# Patient Record
Sex: FEMALE | Race: WHITE | Employment: FULL TIME | URBAN - METROPOLITAN AREA
[De-identification: names, ages, dates, MRNs, and addresses within clinical notes are randomized per-mention and may not be internally consistent; named-entity substitution may affect disease eponyms.]

---

## 2018-08-29 ENCOUNTER — HOSPITAL ENCOUNTER (INPATIENT)
Age: 68
LOS: 6 days | Discharge: HOME | DRG: 881 | End: 2018-09-04
Attending: PSYCHIATRY & NEUROLOGY
Payer: MEDICARE

## 2018-08-29 PROBLEM — F32.9 MAJOR DEPRESSIVE DISORDER: Status: ACTIVE | Noted: 2018-08-29

## 2018-08-29 PROBLEM — E11.9 TYPE 2 DIABETES MELLITUS (HCC): Status: ACTIVE | Noted: 2018-08-29

## 2018-08-29 LAB
APTT PPP: 36.4 SEC (ref 22.1–32)
BASOPHILS # BLD: 0 K/UL (ref 0–0.1)
BASOPHILS NFR BLD: 0 % (ref 0–1)
DIFFERENTIAL METHOD BLD: NORMAL
EOSINOPHIL # BLD: 0.1 K/UL (ref 0–0.4)
EOSINOPHIL NFR BLD: 1 % (ref 0–7)
ERYTHROCYTE [DISTWIDTH] IN BLOOD BY AUTOMATED COUNT: 13.2 % (ref 11.5–14.5)
EST. AVERAGE GLUCOSE BLD GHB EST-MCNC: 160 MG/DL
GLUCOSE BLD STRIP.AUTO-MCNC: 118 MG/DL (ref 65–100)
GLUCOSE BLD STRIP.AUTO-MCNC: 153 MG/DL (ref 65–100)
HBA1C MFR BLD: 7.2 % (ref 4.2–6.3)
HCT VFR BLD AUTO: 40.1 % (ref 35–47)
HGB BLD-MCNC: 13.3 G/DL (ref 11.5–16)
IMM GRANULOCYTES # BLD: 0 K/UL (ref 0–0.04)
IMM GRANULOCYTES NFR BLD AUTO: 0 % (ref 0–0.5)
INR PPP: 1 (ref 0.9–1.1)
LYMPHOCYTES # BLD: 2.4 K/UL (ref 0.8–3.5)
LYMPHOCYTES NFR BLD: 28 % (ref 12–49)
MCH RBC QN AUTO: 31.1 PG (ref 26–34)
MCHC RBC AUTO-ENTMCNC: 33.2 G/DL (ref 30–36.5)
MCV RBC AUTO: 93.7 FL (ref 80–99)
MONOCYTES # BLD: 0.6 K/UL (ref 0–1)
MONOCYTES NFR BLD: 7 % (ref 5–13)
NEUTS SEG # BLD: 5.5 K/UL (ref 1.8–8)
NEUTS SEG NFR BLD: 64 % (ref 32–75)
NRBC # BLD: 0 K/UL (ref 0–0.01)
NRBC BLD-RTO: 0 PER 100 WBC
PLATELET # BLD AUTO: 292 K/UL (ref 150–400)
PMV BLD AUTO: 10.9 FL (ref 8.9–12.9)
PROTHROMBIN TIME: 10.2 SEC (ref 9–11.1)
RBC # BLD AUTO: 4.28 M/UL (ref 3.8–5.2)
SERVICE CMNT-IMP: ABNORMAL
SERVICE CMNT-IMP: ABNORMAL
THERAPEUTIC RANGE,PTTT: ABNORMAL SECS (ref 58–77)
TSH SERPL DL<=0.05 MIU/L-ACNC: 6.23 UIU/ML (ref 0.36–3.74)
WBC # BLD AUTO: 8.6 K/UL (ref 3.6–11)

## 2018-08-29 PROCEDURE — 84443 ASSAY THYROID STIM HORMONE: CPT

## 2018-08-29 PROCEDURE — 93041 RHYTHM ECG TRACING: CPT

## 2018-08-29 PROCEDURE — 81001 URINALYSIS AUTO W/SCOPE: CPT | Performed by: FAMILY MEDICINE

## 2018-08-29 PROCEDURE — 74011250637 HC RX REV CODE- 250/637

## 2018-08-29 PROCEDURE — 82962 GLUCOSE BLOOD TEST: CPT

## 2018-08-29 PROCEDURE — 74011250637 HC RX REV CODE- 250/637: Performed by: PSYCHIATRY & NEUROLOGY

## 2018-08-29 PROCEDURE — 85025 COMPLETE CBC W/AUTO DIFF WBC: CPT | Performed by: PSYCHIATRY & NEUROLOGY

## 2018-08-29 PROCEDURE — 85730 THROMBOPLASTIN TIME PARTIAL: CPT | Performed by: FAMILY MEDICINE

## 2018-08-29 PROCEDURE — 36415 COLL VENOUS BLD VENIPUNCTURE: CPT | Performed by: FAMILY MEDICINE

## 2018-08-29 PROCEDURE — 83036 HEMOGLOBIN GLYCOSYLATED A1C: CPT | Performed by: FAMILY MEDICINE

## 2018-08-29 PROCEDURE — 65220000003 HC RM SEMIPRIVATE PSYCH

## 2018-08-29 PROCEDURE — 85610 PROTHROMBIN TIME: CPT | Performed by: FAMILY MEDICINE

## 2018-08-29 RX ORDER — LOSARTAN POTASSIUM 50 MG/1
100 TABLET ORAL DAILY
Status: DISCONTINUED | OUTPATIENT
Start: 2018-08-30 | End: 2018-08-30 | Stop reason: SDUPTHER

## 2018-08-29 RX ORDER — BENZTROPINE MESYLATE 1 MG/ML
2 INJECTION INTRAMUSCULAR; INTRAVENOUS
Status: DISCONTINUED | OUTPATIENT
Start: 2018-08-29 | End: 2018-09-04 | Stop reason: HOSPADM

## 2018-08-29 RX ORDER — BENZTROPINE MESYLATE 1 MG/1
2 TABLET ORAL
Status: DISCONTINUED | OUTPATIENT
Start: 2018-08-29 | End: 2018-09-04 | Stop reason: HOSPADM

## 2018-08-29 RX ORDER — FUROSEMIDE 20 MG/1
20 TABLET ORAL
COMMUNITY
End: 2018-09-04

## 2018-08-29 RX ORDER — LOPERAMIDE HYDROCHLORIDE 2 MG/1
4 CAPSULE ORAL
Status: DISCONTINUED | OUTPATIENT
Start: 2018-08-29 | End: 2018-09-04 | Stop reason: HOSPADM

## 2018-08-29 RX ORDER — METFORMIN HYDROCHLORIDE 500 MG/1
500 TABLET ORAL
Status: DISCONTINUED | OUTPATIENT
Start: 2018-08-29 | End: 2018-08-29

## 2018-08-29 RX ORDER — LORAZEPAM 1 MG/1
1 TABLET ORAL
Status: DISCONTINUED | OUTPATIENT
Start: 2018-08-29 | End: 2018-08-30

## 2018-08-29 RX ORDER — DILTIAZEM HYDROCHLORIDE 360 MG/1
360 CAPSULE, EXTENDED RELEASE ORAL DAILY
COMMUNITY

## 2018-08-29 RX ORDER — DILTIAZEM HYDROCHLORIDE 180 MG/1
360 CAPSULE, COATED, EXTENDED RELEASE ORAL DAILY
Status: DISCONTINUED | OUTPATIENT
Start: 2018-08-29 | End: 2018-09-04 | Stop reason: HOSPADM

## 2018-08-29 RX ORDER — METFORMIN HYDROCHLORIDE 500 MG/1
TABLET ORAL
COMMUNITY

## 2018-08-29 RX ORDER — IBUPROFEN 200 MG
1 TABLET ORAL
Status: DISCONTINUED | OUTPATIENT
Start: 2018-08-29 | End: 2018-09-04 | Stop reason: HOSPADM

## 2018-08-29 RX ORDER — VALSARTAN AND HYDROCHLOROTHIAZIDE 160; 25 MG/1; MG/1
1 TABLET ORAL DAILY
COMMUNITY

## 2018-08-29 RX ORDER — LORAZEPAM 2 MG/ML
2 INJECTION INTRAMUSCULAR
Status: DISCONTINUED | OUTPATIENT
Start: 2018-08-29 | End: 2018-09-04 | Stop reason: HOSPADM

## 2018-08-29 RX ORDER — ZOLPIDEM TARTRATE 5 MG/1
5 TABLET ORAL
Status: DISCONTINUED | OUTPATIENT
Start: 2018-08-29 | End: 2018-09-04 | Stop reason: HOSPADM

## 2018-08-29 RX ORDER — TRAZODONE HYDROCHLORIDE 50 MG/1
50 TABLET ORAL
Status: DISCONTINUED | OUTPATIENT
Start: 2018-08-29 | End: 2018-09-04 | Stop reason: HOSPADM

## 2018-08-29 RX ORDER — CITALOPRAM 20 MG/1
20 TABLET, FILM COATED ORAL DAILY
COMMUNITY
End: 2018-09-04

## 2018-08-29 RX ORDER — TRAZODONE HYDROCHLORIDE 50 MG/1
50 TABLET ORAL
Status: DISCONTINUED | OUTPATIENT
Start: 2018-08-29 | End: 2018-08-29

## 2018-08-29 RX ORDER — MAGNESIUM SULFATE 100 %
4 CRYSTALS MISCELLANEOUS AS NEEDED
Status: DISCONTINUED | OUTPATIENT
Start: 2018-08-29 | End: 2018-09-04 | Stop reason: HOSPADM

## 2018-08-29 RX ORDER — INSULIN LISPRO 100 [IU]/ML
INJECTION, SOLUTION INTRAVENOUS; SUBCUTANEOUS
Status: DISCONTINUED | OUTPATIENT
Start: 2018-08-29 | End: 2018-08-29

## 2018-08-29 RX ORDER — TRAZODONE HYDROCHLORIDE 50 MG/1
50 TABLET ORAL
COMMUNITY

## 2018-08-29 RX ORDER — INSULIN LISPRO 100 [IU]/ML
INJECTION, SOLUTION INTRAVENOUS; SUBCUTANEOUS 2 TIMES DAILY
Status: DISCONTINUED | OUTPATIENT
Start: 2018-08-30 | End: 2018-08-31

## 2018-08-29 RX ORDER — HYDROCHLOROTHIAZIDE 25 MG/1
25 TABLET ORAL DAILY
Status: DISCONTINUED | OUTPATIENT
Start: 2018-08-30 | End: 2018-08-30 | Stop reason: SDUPTHER

## 2018-08-29 RX ORDER — METFORMIN HYDROCHLORIDE 500 MG/1
500 TABLET ORAL
Status: DISCONTINUED | OUTPATIENT
Start: 2018-08-29 | End: 2018-09-04 | Stop reason: HOSPADM

## 2018-08-29 RX ORDER — METFORMIN HYDROCHLORIDE 500 MG/1
1000 TABLET ORAL
Status: DISCONTINUED | OUTPATIENT
Start: 2018-08-29 | End: 2018-09-04 | Stop reason: HOSPADM

## 2018-08-29 RX ORDER — ACETAMINOPHEN 325 MG/1
650 TABLET ORAL
Status: DISCONTINUED | OUTPATIENT
Start: 2018-08-29 | End: 2018-09-04 | Stop reason: HOSPADM

## 2018-08-29 RX ORDER — GABAPENTIN 300 MG/1
600 CAPSULE ORAL 2 TIMES DAILY
Status: DISCONTINUED | OUTPATIENT
Start: 2018-08-29 | End: 2018-09-04 | Stop reason: HOSPADM

## 2018-08-29 RX ORDER — GABAPENTIN 300 MG/1
300 CAPSULE ORAL 2 TIMES DAILY
COMMUNITY
End: 2018-09-04

## 2018-08-29 RX ORDER — CITALOPRAM 20 MG/1
40 TABLET, FILM COATED ORAL DAILY
Status: DISCONTINUED | OUTPATIENT
Start: 2018-08-29 | End: 2018-09-04 | Stop reason: HOSPADM

## 2018-08-29 RX ORDER — IBUPROFEN 400 MG/1
400 TABLET ORAL
Status: DISCONTINUED | OUTPATIENT
Start: 2018-08-29 | End: 2018-08-29

## 2018-08-29 RX ORDER — ADHESIVE BANDAGE
30 BANDAGE TOPICAL DAILY PRN
Status: DISCONTINUED | OUTPATIENT
Start: 2018-08-29 | End: 2018-09-04 | Stop reason: HOSPADM

## 2018-08-29 RX ORDER — DEXTROSE 50 % IN WATER (D50W) INTRAVENOUS SYRINGE
25-50 AS NEEDED
Status: DISCONTINUED | OUTPATIENT
Start: 2018-08-29 | End: 2018-09-04 | Stop reason: HOSPADM

## 2018-08-29 RX ORDER — OLANZAPINE 5 MG/1
5 TABLET ORAL
Status: DISCONTINUED | OUTPATIENT
Start: 2018-08-29 | End: 2018-09-04 | Stop reason: HOSPADM

## 2018-08-29 RX ADMIN — METFORMIN HYDROCHLORIDE 1000 MG: 500 TABLET ORAL at 09:19

## 2018-08-29 RX ADMIN — GABAPENTIN 600 MG: 300 CAPSULE ORAL at 09:18

## 2018-08-29 RX ADMIN — LOPERAMIDE HYDROCHLORIDE 4 MG: 2 CAPSULE ORAL at 17:35

## 2018-08-29 RX ADMIN — RIVAROXABAN 20 MG: 20 TABLET, FILM COATED ORAL at 17:35

## 2018-08-29 RX ADMIN — GABAPENTIN 600 MG: 300 CAPSULE ORAL at 17:34

## 2018-08-29 RX ADMIN — CITALOPRAM HYDROBROMIDE 40 MG: 20 TABLET ORAL at 09:18

## 2018-08-29 RX ADMIN — METFORMIN HYDROCHLORIDE 500 MG: 500 TABLET ORAL at 21:00

## 2018-08-29 RX ADMIN — DILTIAZEM HYDROCHLORIDE 360 MG: 180 CAPSULE, COATED, EXTENDED RELEASE ORAL at 14:21

## 2018-08-29 RX ADMIN — TRAZODONE HYDROCHLORIDE 50 MG: 50 TABLET ORAL at 21:00

## 2018-08-29 NOTE — PROGRESS NOTES
Admission Medication Reconciliation: 
 
Information obtained from:  Communication with [facility]Query Comments/Recommendations: Updated PTA meds/reviewed patient's allergies. 1)  Called pharmacy to confirm medications. 2)  Medication changes (since last review): Added 
- all medications 3)  Of note, the patient filled alprazolam 0.25mg TID PRN (#60, 20 ds) on 7/16 but it is not a regular medication for her. Also, pharmacy has glimepiride 2mg BID on hold from 7/10/18 (never picked up). Allergies:  Robaxin [methocarbamol] and Morphine Significant PMH/Disease States: No past medical history on file. Chief Complaint for this Admission:  No chief complaint on file. Prior to Admission Medications:  
Prior to Admission Medications Prescriptions Last Dose Informant Patient Reported? Taking?  
citalopram (CELEXA) 20 mg tablet   Yes Yes Sig: Take 20 mg by mouth daily. Indications: major depressive disorder  
dilTIAZem (TIAZAC) 360 mg ER capsule   Yes Yes Sig: Take 360 mg by mouth daily. furosemide (LASIX) 20 mg tablet   Yes Yes Sig: Take 20 mg by mouth daily as needed. gabapentin (NEURONTIN) 300 mg capsule   Yes Yes Sig: Take 300 mg by mouth two (2) times a day. metFORMIN (GLUCOPHAGE) 500 mg tablet   Yes Yes Sig: Take 1000mg every morning and 500mg every evening  Indications: type 2 diabetes mellitus  
rivaroxaban (XARELTO) 20 mg tab tablet   Yes Yes Sig: Take 20 mg by mouth daily. traZODone (DESYREL) 50 mg tablet   Yes Yes Sig: Take 50 mg by mouth nightly. Indications: insomnia associated with depression  
valsartan-hydroCHLOROthiazide (DIOVAN-HCT) 160-25 mg per tablet   Yes Yes Sig: Take 1 Tab by mouth daily. Indications: hypertension Facility-Administered Medications: None Aspen Herrera, PharmD, BCPP, BCPS Clinical Pharmacy Specialist, Tara Benítez

## 2018-08-29 NOTE — INTERDISCIPLINARY ROUNDS
Behavioral Health Interdisciplinary Rounds Patient Name: Joellen Multani  Age: 69 y.o. Room/Bed:  732/02 Primary Diagnosis: <principal problem not specified> Admission Status: TDO Readmission within 30 days: no 
Power of  in place: no 
Patient requires a blocked bed: no          Reason for blocked bed: n/a 
 
VTE Prophylaxis: Not indicated Mobility needs/Fall risk: no   
Nutritional Plan: no 
Consults: H&P completed Labs/Testing due today?: yes-UA/C&S, ECG Sleep hours:  Admit @ 0400 Participation in Care/Groups:  NEW ADMIT Medication Compliant?: NO NEW MEDS GIVEN  
PRNS (last 24 hours): None Restraints (last 24 hours):  no 
  
CIWA (range last 24 hours): COWS (range last 24 hours): Alcohol screening (AUDIT) completed -   AUDIT Score: 0 If applicable, date SBIRT discussed in treatment team AND documented:  
 
Tobacco - patient is a smoker: Have You Used Tobacco in the Past 30 Days: No 
Illegal Drugs use: Have You Used Any Illegal Substances Over the Past 12 Months: No 
 
24 hour chart check complete: yes Patient goal(s) for today:  
Treatment team focus/goals: Plan to set up her hearing and assess for medications and discharge needs. Progress note - she was sad and tearful in treatment team.  Talked about the death of her  and her feelings towards her only son LOS:  0  Expected LOS: TBD Financial concerns/prescription coverage: medicare Date of last family contact:      
Family requesting physician contact today:   
Discharge plan: she will return home when ready for discharge Guns in the home:   Yes Outpatient provider(s):TBD Participating treatment team members: Jessieileana Rangela, Thedford Larsson, SW - Dr. Ron Garter - Leopoldo Punches

## 2018-08-29 NOTE — BH NOTES
Admission Note: 
 
Patient admitted under the care of Dr. Keyana Renner For major depression and SI Admission Status: TDO Reason for Admission: Pt has been undergoing financial stressors, depression, and SI since  passed 1 month ago. UDS +Benzos BAL Neg 
 
Pt's Condition on Arrival: Pt is labile and speech is excessively loud. She has sarcastic behavior. She cooperated with admission process. Pt's Statements/Questions/Concerns: None Dual Skin Assessment completed by Khadra Parrish RN was unremarkable. Belongings searched by Clementine Wilson RN secured. Pt pharmacy of choice is [facility]

## 2018-08-29 NOTE — IP AVS SNAPSHOT
[address] [facility] [address] [place] [address] 
[phone] Patient: Rafaela Loya MRN: [id number] :1950 A check foreign indicates which time of day the medication should be taken. My Medications CHANGE how you take these medications Instructions Each Dose to Equal  
 Morning Noon Evening Bedtime  
 citalopram 40 mg tablet Commonly known as:  Conrado Funez Start taking on:  2018 What changed:   
- medication strength 
- how much to take Your next dose is:  9am  
   
 Take 1 Tab by mouth daily. Indications: Generalized Anxiety Disorder, major depressive disorder 40 mg  
    
  
   
   
   
  
 gabapentin 300 mg capsule Commonly known as:  NEURONTIN What changed:  how much to take Your next dose is:  9am and 9pm  
   
 Take 2 Caps by mouth two (2) times a day. Indications: NEUROPATHIC PAIN  
 600 mg CONTINUE taking these medications Instructions Each Dose to Equal  
 Morning Noon Evening Bedtime  
 dilTIAZem 360 mg ER capsule Commonly known as:  [facility] Your next dose is:  9am  
   
 Take 360 mg by mouth daily. 360 mg  
    
  
   
   
   
  
 metFORMIN 500 mg tablet Commonly known as:  GLUCOPHAGE Your next dose is:  9am and 5pm  
   
 Take 1000mg every morning and 500mg every evening  Indications: type 2 diabetes mellitus  
     
  
   
   
  
   
  
 traZODone 50 mg tablet Commonly known as:  Kana [facility] Your next dose is:  bedtime Take 50 mg by mouth nightly. Indications: insomnia associated with depression 50 mg  
    
   
   
   
  
  
 valsartan-hydroCHLOROthiazide 160-25 mg per tablet Commonly known as:  DIOVAN-HCT Your next dose is:  9am  
   
 Take 1 Tab by mouth daily. Indications: hypertension 1 Tab XARELTO 20 mg Tab tablet Generic drug:  rivaroxaban Your next dose is:  9am  
   
 Take 20 mg by mouth daily.   
 20 mg  
    
  
   
 STOP taking these medications   
 furosemide 20 mg tablet Commonly known as:  LASIX Where to Get Your Medications Information on where to get these meds will be given to you by the nurse or doctor. ! Ask your nurse or doctor about these medications  
  citalopram 40 mg tablet  
 gabapentin 300 mg capsule

## 2018-08-29 NOTE — H&P
1500 [facility] HISTORY AND PHYSICAL Joo Flower 
MR#: [id number] : 1950 ACCOUNT #: [de-identified] ADMIT DATE: 2018 CHIEF COMPLAINT:  Patient does not provide. HISTORY OF PRESENT ILLNESS:  A 78-year-old white female with past medical history of type 2 diabetes mellitus, peripheral neuropathy, anxiety, depression, atrial fibrillation, long-term anticoagulation, posttraumatic stress disorder (PTSD), UTI, sleep apnea presented as a direct admission with a diagnosis of major depression and suicidal ideation. At current patient is a reluctant historian. She was initially asleep and upon arousal provides some short answers to questions. As such, majority of history has been obtained from review of the transfer records from [facility].  Per the collective reports, the patient reportedly had pulled a gun with suicidal gesture, ideation, thoughts, and plan of killing herself. Her son reportedly had called 46. The patient was taken to the hospital and a TDO was obtained. The patient reportedly has had prior psychiatric evaluations at [facility].  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 Per review of chart records, the patient is taking Celexa. There have been no reports of any new onset dizziness, lightheadedness, focal weakness, numbness, paresthesia, slurred speech, facial droop, headache, neck pain, back pain, chest pain, shortness of breath, cough, congestion, abdominal pain, nausea, vomiting, diarrhea, melena, dysuria, hematuria, calf pain, swelling, edema, fever, chills, or rash. Per the transfer records, the patient had a urinalysis. It had been listed that the patient had a UTI with UA showing leukoesterase large, nitrites negative, WBCs 8, but bacteria was rare and not positive. Urine drug screen was positive for benzodiazepines.   CT of the head without contrast from transferring hospital showed no acute process. The patient was subsequently transferred to [facility] for further evaluation and treatments. PAST MEDICAL HISTORY:   
1. Type 2 diabetes mellitus. 2.  Peripheral neuropathy. 3.  Anxiety. 4.  Depression. 5.  Atrial fibrillation. 6.  Long-term anticoagulation on Xarelto. 7.  PTSD. 8.  UTI. 9.  Sleep apnea, reportedly not using CPAP at home. PAST SURGICAL HISTORY:   
1. Cholecystectomy. 2.  Bilateral tubal ligation. 3.  Cervical laminectomy. 4.  Appendectomy. 5.  Laser surgery to cervix. 6.  Dilation and curettage. 7.  Cardiac ablation. MEDICATIONS:  Metformin 500 mg 2 tablets p.o. q.a.m. and 1 tablet p.o. at bedtime, gabapentin 600 mg p.o. b.i.d., Cardizem 160 mg p.o. daily, Celexa 40 mg p.o. daily, trazodone 50 mg p.o. at bedtime, Xarelto 10 mg p.o. daily, Lasix 20 mg p.o. daily. ALLERGIES:  ROBAXIN. SOCIAL HISTORY:  The patient denies smoking, alcohol, or illicit drug use. FAMILY HISTORY:  Myocardial infarction, mother. Stroke, father. REVIEW OF SYSTEMS:  Pertinent positives as noted in the HPI. All other systems were reviewed and negative. PHYSICAL EXAMINATION:   
VITAL SIGNS:  Temperature 97.7 degrees Fahrenheit, blood pressure 162/78, heart rate 81, respiratory rate of 14, O2 saturation 98% on room air. Weight not recorded. GENERAL:  Overweight female in no acute respiratory distress. PSYCHIATRIC:  Patient is awake, alert, oriented x3. NEUROLOGIC:  GCS of 15. Moves extremities x4. Sensation grossly intact without slurred speech or facial droop. HEENT:  Normocephalic, atraumatic. PERRLA. EOMs intact. Sclerae are anicteric. Conjunctivae are clear. Nares are patent. Oropharynx clear. Tongue is midline, not edematous. NECK:  Supple, without lymphadenopathy, JVD, carotid bruits, or thyromegaly. LYMPHATIC:  Negative for cervical or supraclavicular adenopathy. RESPIRATORY:  Lungs are clear to auscultation bilaterally. CARDIOVASCULAR:  Heart regular rate and rhythm. Normal S1, S2, without murmurs, rubs, or gallops. GASTROINTESTINAL:  Abdomen is soft, nontender, nondistended. Normoactive bowel sounds. No rebound, guarding, rigidity. No auscultated abdominal bruits or pulsatile mass. BACK:  No CVA tenderness. No step-off deformity. MUSCULOSKELETAL:  No acute palpable bony deformity. Negative for calf tenderness. VASCULAR:  2+ radial, 1+ dorsalis pedis pulses without cyanosis, clubbing, edema. SKIN:  Warm and dry. LABORATORY DATA:  I reviewed from the transfer hospital.  Sodium 135, potassium 3.4, chloride 101, CO2 of 20, BUN of 14, creatinine 1.2, glucose 95, anion gap 14, calcium is 10.4, total protein 9.0, albumin 4.6, ALT of 16, AST of 18, total bilirubin 0.5, GFR of 45. Urine drug screen positive for benzodiazepines. CT of the head without contrast:  No acute intracranial abnormality. WBC 12.5, hemoglobin 15.1, hematocrit of 45.0, platelets of 496. Serum alcohol less than 10. TSH 0.47. Urinalysis:  Leukocyte esterase large, nitrite negative, WBC 8, bacteria rare. IMPRESSION AND PLAN:    
1. Major depression. Admit patient to [facility] for further evaluation and treatment. 2.  Suicidal ideation, thoughts, and plan. Would recommend suicide precautions. Continue with psychiatric treatment. 3.  Anxiety. Plan as noted above. 4.  Atrial fibrillation. Order 12-lead EKG and continue on current home medications, currently on Cardizem. 5.  Longterm anticoagulation with Xarelto. Continue on the same. 6.  Type 2 diabetes mellitus. Place on Humalog insulin correctional coverage, scheduled Accu-Chek, and check hemoglobin A1c level. 7.  Peripheral neuropathy. Continue on gabapentin. 8.  Abnormal urinalysis but not definite for urinary tract infection based on the lab report from transferring hospital.  As such, will order repeat urinalysis with microscopy today for further evaluation. 9.  Sleep apnea. Would recommend using CPAP; however, the patient reportedly is not compliant with use of the same at home. 10.  Venous thromboembolism. The patient may be ambulatory t.i.d. MD BOOKER Collins/ 
D: 08/29/2018 06:04    
T: 08/29/2018 07:07 JOB #: [id number]

## 2018-08-29 NOTE — IP AVS SNAPSHOT
[facility] [facility] [address] [place] [address] 
[phone] Patient: Mccoy Angelucci MRN: [id number] :1950 About your hospitalization You were admitted on:  2018 You last received care in the:  [address] You were discharged on:  2018 Why you were hospitalized Your primary diagnosis was: Major Depressive Disorder Your diagnoses also included:  Type 2 Diabetes Mellitus (Hcc) Follow-up Information Follow up With Details Comments Contact Info Maximo James MD On 2018 You have an 11:00am hospital discharge appointment with your primary care provider. Please arrive 15 minutes early. 1000 [facility] [address] 
[phone] Jessica Munoz On 2018 You have a 9:30am appointment for individual therapy. Plan to be in this appointment until 11:00am. [facility] 
26[address] [facility], [address] [place] [address] 
[phone] Discharge Orders None A check foreign indicates which time of day the medication should be taken. My Medications CHANGE how you take these medications Instructions Each Dose to Equal  
 Morning Noon Evening Bedtime  
 citalopram 40 mg tablet Commonly known as:  Carmella Ricketts Start taking on:  2018 What changed:   
- medication strength 
- how much to take Your next dose is:  9am  
   
 Take 1 Tab by mouth daily. Indications: Generalized Anxiety Disorder, major depressive disorder 40 mg  
    
  
   
   
   
  
 gabapentin 300 mg capsule Commonly known as:  NEURONTIN What changed:  how much to take Your next dose is:  9am and 9pm  
   
 Take 2 Caps by mouth two (2) times a day. Indications: NEUROPATHIC PAIN  
 600 mg CONTINUE taking these medications Instructions Each Dose to Equal  
 Morning Noon Evening Bedtime  
 dilTIAZem 360 mg ER capsule Commonly known as:  [facility]  
 Your next dose is:  9am  
   
 Take 360 mg by mouth daily. 360 mg  
    
  
   
   
   
  
 metFORMIN 500 mg tablet Commonly known as:  GLUCOPHAGE Your next dose is:  9am and 5pm  
   
 Take 1000mg every morning and 500mg every evening  Indications: type 2 diabetes mellitus  
     
  
   
   
  
   
  
 traZODone 50 mg tablet Commonly known as:  Rebbecca Bunde Your next dose is:  bedtime Take 50 mg by mouth nightly. Indications: insomnia associated with depression 50 mg  
    
   
   
   
  
  
 valsartan-hydroCHLOROthiazide 160-25 mg per tablet Commonly known as:  DIOVAN-HCT Your next dose is:  9am  
   
 Take 1 Tab by mouth daily. Indications: hypertension 1 Tab XARELTO 20 mg Tab tablet Generic drug:  rivaroxaban Your next dose is:  9am  
   
 Take 20 mg by mouth daily. 20 mg  
    
  
   
   
   
  
  
STOP taking these medications   
 furosemide 20 mg tablet Commonly known as:  LASIX Where to Get Your Medications Information on where to get these meds will be given to you by the nurse or doctor. ! Ask your nurse or doctor about these medications  
  citalopram 40 mg tablet  
 gabapentin 300 mg capsule Discharge Instructions DISCHARGE SUMMARY 
 
NAME:Isabel Allison : 1950 MRN: [id number] The patient Giovanni Ghotra exhibits the ability to control behavior in a less restrictive environment. Patient's level of functioning is improving. No assaultive/destructive behavior has been observed for the past 24 hours. No suicidal/homicidal threat or behavior has been observed for the past 24 hours. There is no evidence of serious medication side effects. Patient has not been in physical or protective restraints for at least the past 24 hours. If weapons involved, how are they secured? Patient's son has confirmed that all weapons have been removed from the home. Is patient aware of and in agreement with discharge plan? Yes Arrangements for medication:  Prescriptions given to patient. Referral for substance abuse treatment? Patient is not using substances/Not applicable. Referral for smoking cessation needed? Patient is not a smoker/Not applicable. Copy of discharge instructions to provider?:  Dr. Caputo  Arrangements for transportation home:  Taxi Keep all follow up appointments as scheduled, continue to take prescribed medications per physician instructions. Mental health crisis number:  717 or your local mental health crisis line number at [phone] or [phone]. DISCHARGE SUMMARY from Nurse PATIENT INSTRUCTIONS: 
 
What to do at Home: 
Recommended activity: Activity as tolerated, If you experience any of the following symptoms thoughts of harming self, feeling overwhelmed with hopelessness, please follow up with your assigned providers and local crisis number. *  Please give a list of your current medications to your Primary Care Provider. *  Please update this list whenever your medications are discontinued, doses are 
    changed, or new medications (including over-the-counter products) are added. *  Please carry medication information at all times in case of emergency situations. These are general instructions for a healthy lifestyle: No smoking/ No tobacco products/ Avoid exposure to second hand smoke Surgeon General's Warning:  Quitting smoking now greatly reduces serious risk to your health. Obesity, smoking, and sedentary lifestyle greatly increases your risk for illness A healthy diet, regular physical exercise & weight monitoring are important for maintaining a healthy lifestyle You may be retaining fluid if you have a history of heart failure or if you experience any of the following symptoms:  Weight gain of 3 pounds or more overnight or 5 pounds in a week, increased swelling in our hands or feet or shortness of breath while lying flat in bed. Please call your doctor as soon as you notice any of these symptoms; do not wait until your next office visit. Recognize signs and symptoms of STROKE: 
 
F-face looks uneven A-arms unable to move or move unevenly S-speech slurred or non-existent T-time-call 911 as soon as signs and symptoms begin-DO NOT go Back to bed or wait to see if you get better-TIME IS BRAIN. Warning Signs of HEART ATTACK Call 911 if you have these symptoms: 
? Chest discomfort. Most heart attacks involve discomfort in the center of the chest that lasts more than a few minutes, or that goes away and comes back. It can feel like uncomfortable pressure, squeezing, fullness, or pain. ? Discomfort in other areas of the upper body. Symptoms can include pain or discomfort in one or both arms, the back, neck, jaw, or stomach. ? Shortness of breath with or without chest discomfort. ? Other signs may include breaking out in a cold sweat, nausea, or lightheadedness. Don't wait more than five minutes to call [address]! Fast action can save your life. Calling 911 is almost always the fastest way to get lifesaving treatment. Emergency Medical Services staff can begin treatment when they arrive  up to an hour sooner than if someone gets to the hospital by car. The discharge information has been reviewed with the patient. The patient verbalized understanding. Discharge medications reviewed with the patient and appropriate educational materials and side effects teaching were provided. ___________________________________________________________________________________________________________________________________ Introducing [facility] & HEALTH SERVICES! Sara Maxwell introduces [facility] patient portal. Now you can access parts of your medical record, email your doctor's office, and request medication refills online.    
 
1. In your internet browser, go to [email or web address] 2. Click on the First Time User? Click Here link in the Sign In box. You will see the New Member Sign Up page. 3. Enter your SecondMic Access Code exactly as it appears below. You will not need to use this code after youve completed the sign-up process. If you do not sign up before the expiration date, you must request a new code. · SecondMic Access Code: [email or web address] Expires: 12/3/2018  4:04 PM 
 
4. Enter the last four digits of your Social Security Number (xxxx) and Date of Birth (mm/dd/yyyy) as indicated and click Submit. You will be taken to the next sign-up page. 5. Create a [employer] ID. This will be your SecondMic login ID and cannot be changed, so think of one that is secure and easy to remember. 6. Create a SecondMic password. You can change your password at any time. 7. Enter your Password Reset Question and Answer. This can be used at a later time if you forget your password. 8. Enter your e-mail address. You will receive e-mail notification when new information is available in [address] 9. Click Sign Up. You can now view and download portions of your medical record. 10. Click the Download Summary menu link to download a portable copy of your medical information. If you have questions, please visit the Frequently Asked Questions section of the SecondMic website. Remember, SecondMic is NOT to be used for urgent needs. For medical emergencies, dial 911. Now available from your iPhone and Android! Introducing Frandy Carrillo As a [facility] patient, I wanted to make you aware of our electronic visit tool called Frandy Carrillo. [facility] 24/7 allows you to connect within minutes with a medical provider 24 hours a day, seven days a week via a mobile device or tablet or logging into a secure website from your computer. You can access Frandy Carrillo from anywhere in the [place]. A virtual visit might be right for you when you have a simple condition and feel like you just dont want to get out of bed, or cant get away from work for an appointment, when your regular [facility] provider is not available (evenings, weekends or holidays), or when youre out of town and need minor care. Electronic visits cost only $49 and if the [employer] provider determines a prescription is needed to treat your condition, one can be electronically transmitted to a nearby pharmacy*. Please take a moment to enroll today if you have not already done so. The enrollment process is free and takes just a few minutes. To enroll, please download the [employer] [place] to your tablet or phone, or visit [email or web address] to enroll on your computer. And, as an [address] [place] [address] patient with a [employer] (?????) account, the results of your visits will be scanned into your electronic medical record and your primary care provider will be able to view the scanned results. We urge you to continue to see your regular [facility] provider for your ongoing medical care. And while your primary care provider may not be the one available when you seek a Frandy Sterlingsara virtual visit, the peace of mind you get from getting a real diagnosis real time can be priceless. For more information on Frandy Carrillo, view our Frequently Asked Questions (FAQs) at [email or web address]. Sincerely, 
 
Dennis Melendrez MD 
Chief Medical Officer Inez8 Milena Lucas *:  certain medications cannot be prescribed via Frandy Carrillo Providers Seen During Your Hospitalization Provider Specialty Primary office phone Emmanuel Harkins MD Psychiatry [phone] Your Primary Care Physician (PCP) Primary Care Physician Office Phone Office Fax Lisa Mendez [phone] You are allergic to the following Allergen Reactions Robaxin (Methocarbamol) Anaphylaxis Morphine Itching Recent Documentation Height Weight Breastfeeding? BMI  
  
 1.626 m 73.3 kg No 27.76 kg/m2 Emergency Contacts Name Discharge Info Relation Home Work Mobile ElderZane YES [1] Child [2] [phone] Patient Belongings The following personal items are in your possession at time of discharge: 
  Dental Appliances: None  Visual Aid: None      Home Medications: Locked (1 bottle of immodium, 4 unknown pills)   Jewelry: Ring, Necklace, Bracelet, Watch, With patient  Clothing: Shirt, Pants, Undergarments, Footwear    Other Valuables: Cell Phone, Efrain Fine  Personal Items Sent to Safe:  (1 debit card) Please provide this summary of care documentation to your next provider. Signatures-by signing, you are acknowledging that this After Visit Summary has been reviewed with you and you have received a copy. Patient Signature:  ____________________________________________________________ Date:  ____________________________________________________________  
  
Riley Graham Provider Signature:  ____________________________________________________________ Date:  ____________________________________________________________

## 2018-08-29 NOTE — PROGRESS NOTES
Hospitalist Progress Note Rebeca cAosta MD 
Answering service: [phone] OR [phone] from in house phone Date of Service:  2018 NAME:  Shakira Joy :  1950 MRN:  [id number] Admission Summary:  
HISTORY OF PRESENT ILLNESS:   
A 66-year-old white female with past medical history of type 2 diabetes mellitus, peripheral neuropathy, anxiety, depression, atrial fibrillation, long-term anticoagulation, posttraumatic stress disorder (PTSD), UTI, sleep apnea presented as a direct admission with a diagnosis of major depression and suicidal ideation. At current patient is a reluctant historian. She was initially asleep and upon arousal provides some short answers to questions. As such, majority of history has been obtained from review of the transfer records from [facility].  Per the collective reports, the patient reportedly had pulled a gun with suicidal gesture, ideation, thoughts, and plan of killing herself. Her son reportedly had called 46. The patient was taken to the hospital and a TDO was obtained. The patient reportedly has had prior psychiatric evaluations at [facility].  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 Per review of chart records, the patient is taking Celexa. There have been no reports of any new onset dizziness, lightheadedness, focal weakness, numbness, paresthesia, slurred speech, facial droop, headache, neck pain, back pain, chest pain, shortness of breath, cough, congestion, abdominal pain, nausea, vomiting, diarrhea, melena, dysuria, hematuria, calf pain, swelling, edema, fever, chills, or rash. Per the transfer records, the patient had a urinalysis. It had been listed that the patient had a UTI with UA showing leukoesterase large, nitrites negative, WBCs 8, but bacteria was rare and not positive.   Urine drug screen was positive for benzodiazepines. CT of the head without contrast from transferring hospital showed no acute process. The patient was subsequently transferred to [facility] for further evaluation and treatments. Interval history / Subjective:  
Feeling fine,no complaint. Assessment & Plan: 1. Major depression. Admit patient to [facility] for further evaluation and treatment. 2.  Suicidal ideation, thoughts, and plan. Would recommend suicide precautions. Continue with psychiatric treatment. 3.  Anxiety. Plan as noted above. 4.  Atrial fibrillation.   
-Controlled. On Cardizem. 5.  Longterm anticoagulation with Xarelto. Continue on the same. 6.  Type 2 diabetes mellitus. Place on Humalog insulin correctional coverage, scheduled Accu-Chek,hGBA1C 7.2 7. Peripheral neuropathy. Continue on gabapentin. 8.  Abnormal urinalysis but not definite for urinary tract infection based on the lab report from transferring hospital.  As such, will order repeat urinalysis with microscopy today for further evaluation. 9.  Sleep apnea. Would recommend using CPAP; however, the patient reportedly is not compliant with use of the same at home. 10.  Venous thromboembolism. The patient may be ambulatory t.i.d. 
  
Care Plan discussed with: Patient/Family Disposition: TBD Hospital Problems  Date Reviewed: 8/29/2018 Codes Class Noted POA Major depressive disorder ICD-10-CM: F32.9 ICD-9-CM: 296.20  8/29/2018 Unknown * (Principal)[facility] ICD-10-CM: E11.9 ICD-9-CM: 250.00  8/29/2018 Unknown Review of Systems: A comprehensive review of systems was negative except for that written in the HPI. Vital Signs:  
 Last 24hrs VS reviewed since prior progress note. Most recent are: 
Visit Vitals  /79  Pulse 92  Temp 97.9 °F (36.6 °C)  Resp 16  SpO2 96%  Breastfeeding No  
 
 
No intake or output data in the 24 hours ending 08/29/18 7446 Physical Examination:  
 
 
     
Constitutional:  No acute distress, cooperative, pleasant   
ENT:  Oral mucous moist, oropharynx benign. Neck supple, Resp:  CTA bilaterally. No wheezing/rhonchi/rales. No accessory muscle use CV:  Regular rhythm, normal rate, no murmurs, gallops, rubs GI:  Soft, non distended, non tender. normoactive bowel sounds, no hepatosplenomegaly Musculoskeletal:  No edema, warm, 2+ pulses throughout Neurologic:  Moves all extremities. AAOx3, CN II-XII reviewed Psych:  Good insight, Not anxious nor agitated. Data Review:  
 Review and/or order of clinical lab test 
Review and/or order of tests in the radiology section of [facility] Labs:  
 
Recent Labs  
   08/29/18 
 0645 WBC  8.6 HGB  13.3 HCT  40.1 PLT  292 No results for input(s): NA, K, CL, CO2, BUN, CREA, GLU, CA, MG, PHOS, URICA in the last 72 hours. No results for input(s): SGOT, GPT, ALT, AP, TBIL, TBILI, TP, ALB, GLOB, GGT, AML, LPSE in the last 72 hours. No lab exists for component: AMYP, HLPSE Recent Labs  
   08/29/18 
 0845 INR  1.0 PTP  10.2 APTT  36.4* No results for input(s): FE, TIBC, PSAT, FERR in the last 72 hours. No results found for: FOL, RBCF No results for input(s): PH, PCO2, PO2 in the last 72 hours. No results for input(s): CPK, CKNDX, TROIQ in the last 72 hours. No lab exists for component: CPKMB No results found for: CHOL, CHOLX, CHLST, CHOLV, HDL, LDL, LDLC, DLDLP, TGLX, TRIGL, TRIGP, CHHD, CHHDX Lab Results Component Value Date/Time Glucose (POC) 118 (H) 08/29/2018 04:25 PM  
 Glucose (POC) 153 (H) 08/29/2018 07:41 AM  
 
No results found for: COLOR, APPRN, SPGRU, REFSG, FAYE, PROTU, GLUCU, KETU, BILU, UROU, ELENA, LEUKU, GLUKE, EPSU, BACTU, WBCU, RBCU, CASTS, UCRY Medications Reviewed:  
 
Current Facility-Administered Medications Medication Dose Route Frequency  metFORMIN (GLUCOPHAGE) tablet 1,000 mg  1,000 mg Oral DAILY WITH BREAKFAST  gabapentin (NEURONTIN) capsule 600 mg  600 mg Oral BID  citalopram (CELEXA) tablet 40 mg  40 mg Oral DAILY  ziprasidone (GEODON) 20 mg in sterile water (preservative free) 1 mL injection  20 mg IntraMUSCular BID PRN  
 OLANZapine (ZyPREXA) tablet 5 mg  5 mg Oral Q6H PRN  
 benztropine (COGENTIN) tablet 2 mg  2 mg Oral BID PRN  
 benztropine (COGENTIN) injection 2 mg  2 mg IntraMUSCular BID PRN  
 LORazepam (ATIVAN) injection 2 mg  2 mg IntraMUSCular Q4H PRN  
 LORazepam (ATIVAN) tablet 1 mg  1 mg Oral Q4H PRN  
 zolpidem (AMBIEN) tablet 5 mg  5 mg Oral QHS PRN  
 acetaminophen (TYLENOL) tablet 650 mg  650 mg Oral Q4H PRN  
 magnesium hydroxide (MILK OF MAGNESIA) 400 mg/5 mL oral suspension 30 mL  30 mL Oral DAILY PRN  
 nicotine (NICODERM CQ) 21 mg/24 hr patch 1 Patch  1 Patch TransDERmal DAILY PRN  
 traZODone (DESYREL) tablet 50 mg  50 mg Oral QHS  metFORMIN (GLUCOPHAGE) tablet 500 mg  500 mg Oral QHS  glucose chewable tablet 16 g  4 Tab Oral PRN  
 dextrose (D50W) injection syrg 12.5-25 g  25-50 mL IntraVENous PRN  
 glucagon (GLUCAGEN) injection 1 mg  1 mg IntraMUSCular PRN  
 insulin lispro (HUMALOG) injection   SubCUTAneous AC&HS  rivaroxaban (XARELTO) tablet 20 mg  20 mg Oral DAILY WITH DINNER  
 dilTIAZem CD (CARDIZEM CD) capsule 360 mg  360 mg Oral DAILY  [START ON 8/30/2018] losartan (COZAAR) tablet 100 mg  100 mg Oral DAILY Or  
 [START ON 8/30/2018] hydroCHLOROthiazide (HYDRODIURIL) tablet 25 mg  25 mg Oral DAILY  loperamide (IMODIUM) capsule 4 mg  4 mg Oral Q6H PRN  
 
______________________________________________________________________ EXPECTED LENGTH OF STAY: - - - 
ACTUAL LENGTH OF STAY:          0 Fredrick Ventura MD

## 2018-08-29 NOTE — PROGRESS NOTES
Problem: Suicide/Homicide (Adult/Pediatric) Goal: *STG: Remains safe in hospital 
Outcome: Progressing Towards Goal 
Pt has been appropriate in her interactions with both staff and peers. She has been visible in short stretches then returns to her room. Social with her roommate. Denies SI/HI. Denies A/VH. Will continue to monitor.

## 2018-08-29 NOTE — PROGRESS NOTES
[facility] 60 Master Treatment Plan for [employer] Date Treatment Plan Initiated: 8/29/18 Treatment Plan Modalities: 
Type of Modality Amount (x minutes) Frequency (x/week) Duration (x days) Name of Responsible Staff Community & wrap-up meetings to encourage peer interactions 15 7 1 LANI [facility] psychotherapy to assist in building coping skills and internal controls 60 7 [address] Therapeutic activity groups to build coping skills 60 7 1 Celestine Tomas Psychoeducation in group setting to address:  
Medication education [address], RN Coping skills Relaxation techniques Symptom management Discharge planning [address] [place] [address] 71 Spirituality [address] Mathieu Burroughs 61 1 1 [employer] Recovery/AA/NA [employer] [place] [address] Physician medication management 15 7 1 Dr. Robles Phan Family meeting/discharge planning [facility] Problem: Falls - Risk of goal will be met by 9/1/18 Goal: *Absence of Falls Document Milo Oneil Fall Risk and appropriate interventions in the flowsheet. Outcome: Progressing Towards Goal 
Fall Risk Interventions: 
  
 
  
 
Medication Interventions: Teach patient to arise slowly History of Falls Interventions: Door open when patient unattended Problem: Suicide/Homicide (Adult/Pediatric) goal will be met by 9/1/18 Goal: *STG: Remains safe in hospital 
Outcome: Progressing Towards Goal 
Pt remains safe in the hospital. Continued on Q 15 minute safety checks. Goal: *STG: Attends activities and groups Outcome: Progressing Towards Goal 
Attended the Community Group. Encouraged to express feelings and concerns. Goal: *STG/LTG: Complies with medication therapy Outcome: Progressing Towards Goal 
Refused Humalog 2 units. Took all other medications as scheduled.

## 2018-08-29 NOTE — BH NOTES
GROUP THERAPY PROGRESS NOTE The patient Cachorro Braden is participating in [employer]. Group time: 30 minutes Personal goal for participation: to orient the patient to the unit. Goal orientation: successful adoption of unit rules Group therapy participation: active Therapeutic interventions reviewed and discussed: Yes Impression of participation:  
 
Mau Galeano 8/29/2018 9:40 AM

## 2018-08-29 NOTE — PROGRESS NOTES
Problem: Discharge Planning Goal: *Discharge to safe environment Outcome: Progressing Towards Goal 
She will return home when ready for discharge She will need psych follow up Goal: *Knowledge of medication management Outcome: Progressing Towards Goal 
She is compliant with treatment Goal: *Knowledge of discharge instructions Outcome: Progressing Towards Goal 
She is able to verbalize discharge instructions
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
## 2018-08-29 NOTE — BH NOTES
Admission reviewed for medical necessity. Will follow with care [facility].

## 2018-08-30 LAB
APPEARANCE UR: CLEAR
ATRIAL RATE: 66 BPM
BACTERIA URNS QL MICRO: NEGATIVE /HPF
BILIRUB UR QL: NEGATIVE
CALCULATED P AXIS, ECG09: 65 DEGREES
CALCULATED R AXIS, ECG10: -59 DEGREES
CALCULATED T AXIS, ECG11: 72 DEGREES
COLOR UR: ABNORMAL
DIAGNOSIS, 93000: NORMAL
EPITH CASTS URNS QL MICRO: ABNORMAL /LPF
GLUCOSE BLD STRIP.AUTO-MCNC: 135 MG/DL (ref 65–100)
GLUCOSE BLD STRIP.AUTO-MCNC: 166 MG/DL (ref 65–100)
GLUCOSE UR STRIP.AUTO-MCNC: NEGATIVE MG/DL
HGB UR QL STRIP: NEGATIVE
KETONES UR QL STRIP.AUTO: NEGATIVE MG/DL
LEUKOCYTE ESTERASE UR QL STRIP.AUTO: ABNORMAL
NITRITE UR QL STRIP.AUTO: NEGATIVE
P-R INTERVAL, ECG05: 196 MS
PH UR STRIP: 6.5 [PH] (ref 5–8)
PROT UR STRIP-MCNC: NEGATIVE MG/DL
Q-T INTERVAL, ECG07: 430 MS
QRS DURATION, ECG06: 86 MS
QTC CALCULATION (BEZET), ECG08: 450 MS
RBC #/AREA URNS HPF: ABNORMAL /HPF (ref 0–5)
SERVICE CMNT-IMP: ABNORMAL
SERVICE CMNT-IMP: ABNORMAL
SP GR UR REFRACTOMETRY: 1.01 (ref 1–1.03)
UA: UC IF INDICATED,UAUC: ABNORMAL
UROBILINOGEN UR QL STRIP.AUTO: 0.2 EU/DL (ref 0.2–1)
VENTRICULAR RATE, ECG03: 66 BPM
WBC URNS QL MICRO: ABNORMAL /HPF (ref 0–4)

## 2018-08-30 PROCEDURE — 82962 GLUCOSE BLOOD TEST: CPT

## 2018-08-30 PROCEDURE — 74011250637 HC RX REV CODE- 250/637: Performed by: PSYCHIATRY & NEUROLOGY

## 2018-08-30 PROCEDURE — 74011250637 HC RX REV CODE- 250/637

## 2018-08-30 PROCEDURE — 74011636637 HC RX REV CODE- 636/637: Performed by: INTERNAL MEDICINE

## 2018-08-30 PROCEDURE — 65220000003 HC RM SEMIPRIVATE PSYCH

## 2018-08-30 RX ORDER — LORAZEPAM 0.5 MG/1
0.5 TABLET ORAL
Status: DISCONTINUED | OUTPATIENT
Start: 2018-08-30 | End: 2018-09-04 | Stop reason: HOSPADM

## 2018-08-30 RX ORDER — LOSARTAN POTASSIUM 50 MG/1
100 TABLET ORAL DAILY
Status: DISCONTINUED | OUTPATIENT
Start: 2018-08-30 | End: 2018-09-04 | Stop reason: HOSPADM

## 2018-08-30 RX ORDER — HYDROCHLOROTHIAZIDE 25 MG/1
25 TABLET ORAL DAILY
Status: DISCONTINUED | OUTPATIENT
Start: 2018-08-30 | End: 2018-09-04 | Stop reason: HOSPADM

## 2018-08-30 RX ADMIN — INSULIN LISPRO 2 UNITS: 100 INJECTION, SOLUTION INTRAVENOUS; SUBCUTANEOUS at 08:33

## 2018-08-30 RX ADMIN — GABAPENTIN 600 MG: 300 CAPSULE ORAL at 08:30

## 2018-08-30 RX ADMIN — METFORMIN HYDROCHLORIDE 1000 MG: 500 TABLET ORAL at 08:30

## 2018-08-30 RX ADMIN — CITALOPRAM HYDROBROMIDE 40 MG: 20 TABLET ORAL at 08:30

## 2018-08-30 RX ADMIN — GABAPENTIN 600 MG: 300 CAPSULE ORAL at 17:14

## 2018-08-30 RX ADMIN — LOSARTAN POTASSIUM 100 MG: 50 TABLET ORAL at 08:37

## 2018-08-30 RX ADMIN — HYDROCHLOROTHIAZIDE 25 MG: 25 TABLET ORAL at 08:36

## 2018-08-30 RX ADMIN — RIVAROXABAN 20 MG: 20 TABLET, FILM COATED ORAL at 17:14

## 2018-08-30 RX ADMIN — DILTIAZEM HYDROCHLORIDE 360 MG: 180 CAPSULE, COATED, EXTENDED RELEASE ORAL at 08:30

## 2018-08-30 RX ADMIN — METFORMIN HYDROCHLORIDE 500 MG: 500 TABLET ORAL at 21:20

## 2018-08-30 RX ADMIN — ACETAMINOPHEN 650 MG: 325 TABLET ORAL at 11:58

## 2018-08-30 RX ADMIN — TRAZODONE HYDROCHLORIDE 50 MG: 50 TABLET ORAL at 21:20

## 2018-08-30 NOTE — PROGRESS NOTES
Problem: Falls - Risk of 
Goal: *Absence of Falls Document Ezra Galindo Fall Risk and appropriate interventions in the flowsheet. Outcome: Progressing Towards Goal 
Fall Risk Interventions: 
Pt ambulates independently and remains free from falls. Medication Interventions: Teach patient to arise slowly History of Falls Interventions: Door open when patient unattended Problem: Suicide/Homicide (Adult/Pediatric) Goal: *STG: Attends activities and groups Outcome: Progressing Towards Goal 
Pt engages with peers during activities

## 2018-08-30 NOTE — PROGRESS NOTES
Hospitalist Progress Note Meryle Mark, MD 
Answering service: [phone] OR [phone] from in house phone Date of Service:  2018 NAME:  Sina Cooper :  1950 MRN:  [id number] Admission Summary:  
HISTORY OF PRESENT ILLNESS:   
A 57-year-old white female with past medical history of type 2 diabetes mellitus, peripheral neuropathy, anxiety, depression, atrial fibrillation, long-term anticoagulation, posttraumatic stress disorder (PTSD), UTI, sleep apnea presented as a direct admission with a diagnosis of major depression and suicidal ideation. At current patient is a reluctant historian. She was initially asleep and upon arousal provides some short answers to questions. As such, majority of history has been obtained from review of the transfer records from [facility].  Per the collective reports, the patient reportedly had pulled a gun with suicidal gesture, ideation, thoughts, and plan of killing herself. Her son reportedly had called 46. The patient was taken to the hospital and a TDO was obtained. The patient reportedly has had prior psychiatric evaluations at [facility].  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 Per review of chart records, the patient is taking Celexa. There have been no reports of any new onset dizziness, lightheadedness, focal weakness, numbness, paresthesia, slurred speech, facial droop, headache, neck pain, back pain, chest pain, shortness of breath, cough, congestion, abdominal pain, nausea, vomiting, diarrhea, melena, dysuria, hematuria, calf pain, swelling, edema, fever, chills, or rash. Per the transfer records, the patient had a urinalysis. It had been listed that the patient had a UTI with UA showing leukoesterase large, nitrites negative, WBCs 8, but bacteria was rare and not positive.   Urine drug screen was positive for benzodiazepines. CT of the head without contrast from transferring hospital showed no acute process. The patient was subsequently transferred [facility] [address] for further evaluation and treatments. Interval history / Subjective: She had headache early due to noise around but now she is fine. Assessment & Plan: 1. Major depression. Admit patient to [address] [place] [address] Psychiatric Unit for further evaluation and treatment. 2.  Suicidal ideation, thoughts, and plan. Would recommend suicide precautions. Continue with psychiatric treatment. 3.  Anxiety. Plan as noted above. 4.  Atrial fibrillation.   
-Controlled. On Cardizem. -S/p ablation 5. Longterm anticoagulation with Xarelto. Continue on the same. 6.  Type 2 diabetes mellitus. Place on Humalog insulin correctional coverage, scheduled Accu-Chek,hGBA1C 7.2 
-Blood sugar controlled 7. Peripheral neuropathy. Continue on gabapentin. 8.  Abnormal urinalysis but not definite for urinary tract infection based on the lab report from transferring hospital.  As such, will order repeat urinalysis with microscopy today for further evaluation. 9.  Sleep apnea. Would recommend using CPAP; however, the patient reportedly is not compliant with use of the same at home. 10.  Venous thromboembolism. The patient may be ambulatory t.i.d. 
  
Care Plan discussed with: Patient/Family Disposition: TBD Hospital Problems  Date Reviewed: 8/29/2018 Codes Class Noted POA * (Principal)Major depressive disorder ICD-10-CM: F32.9 ICD-9-CM: 296.20  8/29/2018 Unknown Type 2 diabetes mellitus (HCC) ICD-10-CM: E11.9 ICD-9-CM: 250.00  8/29/2018 Unknown Review of Systems: A comprehensive review of systems was negative except for that written in the HPI. Vital Signs:  
 Last 24hrs VS reviewed since prior progress note. Most recent are: Visit Vitals  /65  Pulse 76  Temp 97.7 °F (36.5 °C)  Resp 18  Ht 5' 4\" (1.626 m)  Wt 74.5 kg (164 lb 5 oz)  SpO2 95%  Breastfeeding No  
 BMI 28.2 kg/m2 No intake or output data in the 24 hours ending 08/30/18 1643 Physical Examination:  
 
 
     
Constitutional:  No acute distress, cooperative, pleasant   
ENT:  Oral mucous moist, oropharynx benign. Neck supple, Resp:  CTA bilaterally. No wheezing/rhonchi/rales. No accessory muscle use CV:  Regular rhythm, normal rate, no murmurs, gallops, rubs GI:  Soft, non distended, non tender. normoactive bowel sounds, no hepatosplenomegaly Musculoskeletal:  No edema, warm, 2+ pulses throughout Neurologic:  Moves all extremities. AAOx3, CN II-XII reviewed Psych:  Good insight, Not anxious nor agitated. Data Review:  
 Review and/or order of clinical lab test 
Review and/or order of tests in the radiology section of CPT Labs:  
 
Recent Labs  
   08/29/18 
 0645 WBC  8.6 HGB  13.3 HCT  40.1 PLT  292 No results for input(s): NA, K, CL, CO2, BUN, CREA, GLU, CA, MG, PHOS, URICA in the last 72 hours. No results for input(s): SGOT, GPT, ALT, AP, TBIL, TBILI, TP, ALB, GLOB, GGT, AML, LPSE in the last 72 hours. No lab exists for component: AMYP, HLPSE Recent Labs  
   08/29/18 
 0845 INR  1.0 PTP  10.2 APTT  36.4* No results for input(s): FE, TIBC, PSAT, FERR in the last 72 hours. No results found for: FOL, RBCF No results for input(s): PH, PCO2, PO2 in the last 72 hours. No results for input(s): CPK, CKNDX, TROIQ in the last 72 hours. No lab exists for component: CPKMB No results found for: CHOL, CHOLX, CHLST, CHOLV, HDL, LDL, LDLC, DLDLP, TGLX, TRIGL, TRIGP, CHHD, CHHDX Lab Results Component Value Date/Time  Glucose (POC) 135 (H) 08/30/2018 04:02 PM  
 Glucose (POC) 166 (H) 08/30/2018 07:40 AM  
 Glucose (POC) 118 (H) 08/29/2018 04:25 PM  
 Glucose (POC) 153 (H) 08/29/2018 07:41 AM  
 
Lab Results Component Value Date/Time Color YELLOW/STRAW 08/29/2018 12:45 PM  
 Appearance CLEAR 08/29/2018 12:45 PM  
 Specific gravity 1.006 08/29/2018 12:45 PM  
 pH (UA) 6.5 08/29/2018 12:45 PM  
 Protein NEGATIVE  08/29/2018 12:45 PM  
 Glucose NEGATIVE  08/29/2018 12:45 PM  
 Ketone NEGATIVE  08/29/2018 12:45 PM  
 Bilirubin NEGATIVE  08/29/2018 12:45 PM  
 Urobilinogen 0.2 08/29/2018 12:45 PM  
 Nitrites NEGATIVE  08/29/2018 12:45 PM  
 Leukocyte Esterase MODERATE (A) 08/29/2018 12:45 PM  
 Epithelial cells FEW 08/29/2018 12:45 PM  
 Bacteria NEGATIVE  08/29/2018 12:45 PM  
 WBC 0-4 08/29/2018 12:45 PM  
 RBC 0-5 08/29/2018 12:45 PM  
 
 
 
Medications Reviewed:  
 
Current Facility-Administered Medications Medication Dose Route Frequency  losartan (COZAAR) tablet 100 mg  100 mg Oral DAILY And  
 hydroCHLOROthiazide (HYDRODIURIL) tablet 25 mg  25 mg Oral DAILY  LORazepam (ATIVAN) tablet 0.5 mg  0.5 mg Oral Q6H PRN  
 metFORMIN (GLUCOPHAGE) tablet 1,000 mg  1,000 mg Oral DAILY WITH BREAKFAST  gabapentin (NEURONTIN) capsule 600 mg  600 mg Oral BID  citalopram (CELEXA) tablet 40 mg  40 mg Oral DAILY  ziprasidone (GEODON) 20 mg in sterile water (preservative free) 1 mL injection  20 mg IntraMUSCular BID PRN  
 OLANZapine (ZyPREXA) tablet 5 mg  5 mg Oral Q6H PRN  
 benztropine (COGENTIN) tablet 2 mg  2 mg Oral BID PRN  
 benztropine (COGENTIN) injection 2 mg  2 mg IntraMUSCular BID PRN  
 LORazepam (ATIVAN) injection 2 mg  2 mg IntraMUSCular Q4H PRN  
 zolpidem (AMBIEN) tablet 5 mg  5 mg Oral QHS PRN  
 acetaminophen (TYLENOL) tablet 650 mg  650 mg Oral Q4H PRN  
 magnesium hydroxide (MILK OF MAGNESIA) 400 mg/5 mL oral suspension 30 mL  30 mL Oral DAILY PRN  
 nicotine (NICODERM CQ) 21 mg/24 hr patch 1 Patch  1 Patch TransDERmal DAILY PRN  
 traZODone (DESYREL) tablet 50 mg  50 mg Oral QHS  metFORMIN (GLUCOPHAGE) tablet 500 mg  500 mg Oral QHS  glucose chewable tablet 16 g  4 Tab Oral PRN  
 dextrose (D50W) injection syrg 12.5-25 g  25-50 mL IntraVENous PRN  
 glucagon (GLUCAGEN) injection 1 mg  1 mg IntraMUSCular PRN  
 rivaroxaban (XARELTO) tablet 20 mg  20 mg Oral DAILY WITH DINNER  
 dilTIAZem CD (CARDIZEM CD) capsule 360 mg  360 mg Oral DAILY  loperamide (IMODIUM) capsule 4 mg  4 mg Oral Q6H PRN  
 insulin lispro (HUMALOG) injection   SubCUTAneous BID  
 
______________________________________________________________________ EXPECTED LENGTH OF STAY: - - - 
ACTUAL LENGTH OF STAY:          1 Fredrick Ventura MD  
 
 
I reviewed the medical records including Vitals, lab, imaging, progress notes and current orders. I did not se anything I can add to the current patient management. We will sign off. Please call us for any question or concerns.
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
## 2018-08-30 NOTE — BH NOTES
GROUP THERAPY PROGRESS NOTE Bill Allison participated in the Acute Unit's afternoon Process Group for yesterday, with a focus  
on identifying feelings, planning for the rest of the day, and preparing for discharge. Group time: 45 minutes. Personal goal for participation: To increase the capacity to improve ones mood and structure. Goal orientation: The patient will be able to identify their feelings, develop a plan for structuring their day,  
and discharge planning. Group therapy participation: With prompting, this patient actively participated in the group. Therapeutic interventions reviewed and discussed: The group members were asked to introduce  
themselves to each other and to see if they could identify an emotion they are having and/or let the group  
know what they want to focus on for the day as they continue to make discharge plans. Impression of participation: The patient said she was \"feeling better\" but that she still had a headache 
that was being treated with Tylenol. She added that she found it difficult to be on this benitez because of all 
the noise sometimes on the unit. She was alert, generally oriented, and less helpless and hopeless than 
yesterday. The patient expressed no current SI/HI and displayed no overt psychotic symptoms in this group. Her affect was not as depressed or anxious as on admission. Her mood reflected her affect. She talked 
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 
money to support herself.

## 2018-08-30 NOTE — PROGRESS NOTES
Problem: Discharge Planning Goal: *Discharge to safe environment Outcome: Progressing Towards Goal 
She will return home when ready for discharge She is able to ID her  as a support Goal: *Knowledge of medication management Outcome: Progressing Towards Goal 
She is compliant with her medications Goal: *Knowledge of discharge instructions Outcome: Progressing Towards Goal 
She is able to verbalize discharge instructions Problem: Patient Education: Go to Patient Education Activity Goal: Patient/Family Education Outcome: Progressing Towards Goal 
Educate patient on discharge instructions

## 2018-08-30 NOTE — PROGRESS NOTES
Problem: Falls - Risk of 
Goal: *Absence of Falls Document Yasmeen Mckeon Fall Risk and appropriate interventions in the flowsheet. Outcome: Progressing Towards Goal 
Fall Risk Interventions: In bed asleep with respirations noted as even and unlabored as chest was rising and falling. Will continue to monitor for safety and 15 minute checks throughout shift. Medication Interventions: Teach patient to arise slowly History of Falls Interventions: Door open when patient unattended

## 2018-08-30 NOTE — INTERDISCIPLINARY ROUNDS
Behavioral Health Interdisciplinary Rounds Patient Name: Chau Avendano  Age: 69 y.o. Room/Bed:  732/02 Primary Diagnosis: Major depressive disorder Admission Status: Involuntary Commitment Readmission within 30 days: no 
Power of  in place: no 
Patient requires a blocked bed: no          Reason for blocked bed: n/a 
 
VTE Prophylaxis: Not indicated Mobility needs/Fall risk: no   
Nutritional Plan: no 
Consults: no        
Labs/Testing due today?: no 
 
Sleep hours: 8+ Participation in Care/Groups:  yes Medication Compliant?: Yes PRNS (last 24 hours): None Restraints (last 24 hours):  no 
  
CIWA (range last 24 hours): COWS (range last 24 hours): Alcohol screening (AUDIT) completed -   AUDIT Score: 0 If applicable, date SBIRT discussed in treatment team AND documented:  
 
Tobacco - patient is a smoker: Have You Used Tobacco in the Past 30 Days: No 
Illegal Drugs use: Have You Used Any Illegal Substances Over the Past 12 Months: No 
 
24 hour chart check complete: yes Patient goal(s) for today:  
Treatment team focus/goals: Plan to titrate her medications and transfer to the general unit. Progress note - She was committed at her hearing today. She is pleasant and complaint with her medications and treatment. LOS:  1  Expected LOS: TBD Financial concerns/prescription coverage:  Medicare Date of last family contact:      
Family requesting physician contact today:   
Discharge plan: she will return home Guns in the home:   Yes Outpatient provider(s): refer to a psychiatrist  
 
Participating treatment team members: Kina Winslow Dr. - [address] [place] [address] Drive.

## 2018-08-30 NOTE — BH NOTES
PRN Medication Documentation Specific patient behavior that led to need for PRN medication: \"I have a headache\" Staff interventions attempted prior to PRN being given: PRN medication given: tylenol 650 mg po Patient response/effectiveness of PRN medication:  Will continue to monitor

## 2018-08-30 NOTE — BH NOTES
GROUP THERAPY PROGRESS NOTE Emi Allison is participating in [employer]. Group time: 10 minutes Personal goal for participation: To wash my clothes, follow my treatment plan, and learn the routine. Goal orientation: personal 
 
Group therapy participation: active Therapeutic interventions reviewed and discussed: Writer listened attentively and explained the unit routine. Impression of participation: Patient participated actively.

## 2018-08-30 NOTE — PROGRESS NOTES
Problem: Falls - Risk of 
Goal: *Absence of Falls Document Adams Browne Fall Risk and appropriate interventions in the flowsheet. Outcome: Progressing Towards Goal 
Fall Risk Interventions: 
  
 
  
 
Medication Interventions: Teach patient to arise slowly History of Falls Interventions: Door open when patient unattended Pt is free from falls. Gait is steady. Problem: Suicide/Homicide (Adult/Pediatric) Goal: *STG: Remains safe in hospital 
Outcome: Progressing Towards Goal 
Pt's mood is congruent with situation. No acting out behaviors observed. Goal: *STG: Attends activities and groups Outcome: Progressing Towards Goal 
Pt has attended all groups today. TRANSFER - OUT REPORT: 
 
[id number] Verbal report given to Aislinn(name) on [address] [facility]  being transferred to [facility](unit) for routine progression of care Report consisted of patients Situation, Background, Assessment and  
Recommendations(SBAR). Information from the following report(s) SBAR was reviewed with the receiving nurse. Opportunity for questions and clarification was provided. Patient transported with: 
 Registered Nurse

## 2018-08-30 NOTE — BH NOTES
GROUP THERAPY PROGRESS NOTE Galen Allison participated in the Acute Unit's afternoon Process Group for yesterday, with a focus  
on identifying feelings, planning for the rest of the day, and preparing for discharge. Group time: This is a note for 18 for a group that met from 12:32 PM to 1:27 PM. Personal goal for participation: To increase the capacity to improve ones mood and structure. Goal orientation: The patient will be able to identify their feelings, develop a plan for structuring their day,  
and discharge planning. Group therapy participation: With prompting, this patient participated in the group. Therapeutic interventions reviewed and discussed: The group members were asked to introduce  
themselves to each other and to see if they could identify an emotion they are having and/or let the group  
know what they want to focus on for the day as they continue to make discharge plans. Impression of participation: The patient said she was \"nervous and upset\" because her   
on  of this year and she feels deprived of her fair share of her 's inheritance. She indicated 
that her 's son was given her 's property and that she was given, \"$500 and told to make 
sure it lasts. \" The patient was alert and generally oriented. She expressed no current SI/HI and displayed no  
overt psychotic symptoms in this group. Her affect was depressed, sad, and angry. Her mood matched her 
affect and included feeling helpless, hopeless, and afraid about how she is going to support herself. This 
was the patient's first process group with the undersigned.

## 2018-08-30 NOTE — BH NOTES
TRANSFER - IN REPORT: 
 
Verbal report received from [address] on [address] [facility]  being received from Acute psychiatry (unit) for routine progression of care Report consisted of patients Situation, Background, Assessment and  
Recommendations(SBAR). Information from the following report(s) SBAR, Kardex and MAR was reviewed with the receiving nurse. Opportunity for questions and clarification was provided. Assessment completed upon patients arrival to unit and care assumed.

## 2018-08-30 NOTE — BH NOTES
GROUP THERAPY PROGRESS NOTE Sarah Beal is participating in Reflections. Group time: 15 minutes Personal goal for participation: unit orientation and daily progress Goal orientation: personal 
 
Group therapy participation: passive Therapeutic interventions reviewed and discussed: \"15 ways to be Happy\" handouts. Impression of participation: Seems in fair spirits. Quiet in group and voiced no complaints.

## 2018-08-31 LAB
GLUCOSE BLD STRIP.AUTO-MCNC: 153 MG/DL (ref 65–100)
GLUCOSE BLD STRIP.AUTO-MCNC: 95 MG/DL (ref 65–100)
SERVICE CMNT-IMP: ABNORMAL
SERVICE CMNT-IMP: NORMAL

## 2018-08-31 PROCEDURE — 74011250637 HC RX REV CODE- 250/637

## 2018-08-31 PROCEDURE — 84436 ASSAY OF TOTAL THYROXINE: CPT | Performed by: FAMILY MEDICINE

## 2018-08-31 PROCEDURE — 82962 GLUCOSE BLOOD TEST: CPT

## 2018-08-31 PROCEDURE — 65220000003 HC RM SEMIPRIVATE PSYCH

## 2018-08-31 PROCEDURE — 74011250637 HC RX REV CODE- 250/637: Performed by: PSYCHIATRY & NEUROLOGY

## 2018-08-31 RX ORDER — INSULIN LISPRO 100 [IU]/ML
INJECTION, SOLUTION INTRAVENOUS; SUBCUTANEOUS 2 TIMES DAILY WITH MEALS
Status: DISCONTINUED | OUTPATIENT
Start: 2018-08-31 | End: 2018-09-04 | Stop reason: HOSPADM

## 2018-08-31 RX ADMIN — DILTIAZEM HYDROCHLORIDE 360 MG: 180 CAPSULE, COATED, EXTENDED RELEASE ORAL at 08:30

## 2018-08-31 RX ADMIN — METFORMIN HYDROCHLORIDE 1000 MG: 500 TABLET ORAL at 08:29

## 2018-08-31 RX ADMIN — METFORMIN HYDROCHLORIDE 500 MG: 500 TABLET ORAL at 20:47

## 2018-08-31 RX ADMIN — LOSARTAN POTASSIUM 100 MG: 50 TABLET ORAL at 08:30

## 2018-08-31 RX ADMIN — RIVAROXABAN 20 MG: 20 TABLET, FILM COATED ORAL at 17:05

## 2018-08-31 RX ADMIN — CITALOPRAM HYDROBROMIDE 40 MG: 20 TABLET ORAL at 08:31

## 2018-08-31 RX ADMIN — HYDROCHLOROTHIAZIDE 25 MG: 25 TABLET ORAL at 08:31

## 2018-08-31 RX ADMIN — TRAZODONE HYDROCHLORIDE 50 MG: 50 TABLET ORAL at 20:47

## 2018-08-31 RX ADMIN — GABAPENTIN 600 MG: 300 CAPSULE ORAL at 08:31

## 2018-08-31 RX ADMIN — GABAPENTIN 600 MG: 300 CAPSULE ORAL at 17:04

## 2018-08-31 NOTE — PROGRESS NOTES
GROUP THERAPY PROGRESS NOTE Sina Cooper is participating in Self-care issues. Group time: 45 minutes Personal goal for participation: Discuss healthy sleep habits Goal orientation: relaxation Group therapy participation: active Therapeutic interventions reviewed and discussed: Discussed do's and don't's of getting a good nights sleep. Discussed importance of setting a routine for your body to adjust to in order to fall asleep each night. Shared personal tips with one another on what works best and what gets in the way of falling asleep at night. Impression of participation: Actively listened, positively contributed to group conversation, brought up topics and helpful tips to further group discussion

## 2018-08-31 NOTE — PROGRESS NOTES
Problem: Unresolved Grief or Loss Goal: *LTG: Resolve the loss & begin renewing old relationships & initiating new contacts with others Outcome: Progressing Towards Goal 
Pt appropriately articulated a strained relationship with son at present due to finances secondary to loss of her . Pt contemplated calling son, and shared her feelings of anger and resentment regarding his miinimal support and assistance following the passing of his father and her .

## 2018-08-31 NOTE — BH NOTES
PSYCHIATRIC PROGRESS NOTE Patient Name  Melia Mcfarland Date of Birth 1950 [facility] [id number] Medical Record Number  [id number] Age  69 y.o. PCP Eloy Putnam MD  
Admit date:  8/29/2018 Room Number  725/02  @ [facility]  
Date of Service  8/30/2018 PSYCHOTHERAPY SESSION NOTE: 
Length of psychotherapy session: 20 minutes Main condition/diagnosis/issues treated during session today, 8/30/2018 : familial discord, financial stressors, depression I employed Cognitive Behavioral therapy techniques, Reality-Oriented psychotherapy, as well as supportive psychotherapy in regards to various ongoing psychosocial stressors, including the following: pre-admission and current problems; housing issues; occupational issues; academic issues; legal issues; medical issues; and stress of hospitalization. Interpersonal relationship issues and psychodynamic conflicts explored. Attempts made to alleviate maladaptive patterns. We, also, worked on issues of denial & effects of substance dependency/use Overall, patient is not progressing Treatment Plan Update (reviewed an updated 8/30/2018) : I will modify psychotherapy tx plan by implementing more stress management strategies, building upon cognitive behavioral techniques, increasing coping skills, as well as shoring up psychological defenses). An extended energy and skill set was needed to engage pt in psychotherapy due to some of the following: resistiveness, complexity, negativity, confrontational nature, hostile behaviors, and/or severe abnormalities in thought processes/psychosis resulting in the loss of expressive/receptive language communication skills. E & M PROGRESS NOTE: 
  
 
 
HISTORY  
   
CC:  \"Depression\" HISTORY OF PRESENT ILLNESS/INTERVAL HISTORY:  (reviewed/updated 8/30/2018). per initial evaluation: The patient, Melia Mcfarland, is a 69 y.o.   [place]ITE [place]  female with a past psychiatric history significant for MDD, past psychiatric hospitalization, who presents at this time with complaints of (and/or evidence of) the following emotional symptoms: depression, suicidal thoughts/threats and anger ,grief and insomnia. .  Additional symptomatology include death of her  one month ago, son seizing all of the money  In her husbands accounts and inability to pay all of her bills. She was angry with her son and decided to hold a gun to her head in front of her son. She says that she feels that her life is worthless, since she cannot pay all of her bills. Taking celexa rx'd by her pcp. .  The above symptoms have been present for several weeks to years. These symptoms are of high severity. These symptoms are constant in nature. The patient's condition has been precipitated by grief, conflict with son, and psychosocial stressors Hetal Allison presents/reports/evidences the following emotional symptoms today, 8/30/2018:depression and suicidal thoughts/threats. The above symptoms have been present for several days to weeks. These symptoms are of  Moderate, improving. . The symptoms are constant in nature. Additional symptomatology and features include low mood, anxiety, headache, irritability, anxiety. She understands that she was committed to the hospital.  
  
SIDE EFFECTS: (reviewed/updated 8/30/2018) None reported or admitted to. ALLERGIES:(reviewed/updated 8/30/2018) Allergies Allergen Reactions  Robaxin [Methocarbamol] Anaphylaxis  Morphine Itching MEDICATIONS PRIOR TO ADMISSION:(reviewed/updated 8/30/2018) Prescriptions Prior to Admission Medication Sig  
 citalopram (CELEXA) 20 mg tablet Take 20 mg by mouth daily.  Indications: major depressive disorder  dilTIAZem (TIAZAC) 360 mg ER capsule Take 360 mg by mouth daily.  furosemide (LASIX) 20 mg tablet Take 20 mg by mouth daily as needed.  rivaroxaban (XARELTO) 20 mg tab tablet Take 20 mg by mouth daily.  traZODone (DESYREL) 50 mg tablet Take 50 mg by mouth nightly. Indications: insomnia associated with depression  valsartan-hydroCHLOROthiazide (DIOVAN-HCT) 160-25 mg per tablet Take 1 Tab by mouth daily. Indications: hypertension  metFORMIN (GLUCOPHAGE) 500 mg tablet Take 1000mg every morning and 500mg every evening  Indications: type 2 diabetes mellitus  gabapentin (NEURONTIN) 300 mg capsule Take 300 mg by mouth two (2) times a day. PAST MEDICAL HISTORY: Past medical history from the initial psychiatric evaluation has been reviewed (reviewed/updated 8/30/2018) with no additional updates (I asked patient and no additional past medical history provided). No past medical history on file. No past surgical history on file. SOCIAL HISTORY: Social history from the initial psychiatric evaluation has been reviewed (reviewed/updated 8/30/2018) with no additional updates (I asked patient and no additional social history provided). Social History Social History  Marital status:  Spouse name: N/A  
 Number of children: N/A  
 Years of education: N/A Occupational History  Not on file. Social History Main Topics  Smoking status: Not on file  Smokeless tobacco: Not on file  Alcohol use Not on file  Drug use: Not on file  Sexual activity: Not on file Other Topics Concern  Not on file Social History Narrative FAMILY HISTORY: Family history from the initial psychiatric evaluation has been reviewed (reviewed/updated 8/30/2018) with no additional updates (I asked patient and no additional family history provided). No family history on file. REVIEW OF SYSTEMS: (reviewed/updated 8/30/2018) Appetite:good Sleep: good All other Review of Systems: depressed mood and anxiety HEENT: headache, neck stiffness Resp: (-)sob [place]: (-)cp [place] [facility] MENTAL STATUS EXAM (MSE): MSE FINDINGS ARE WITHIN NORMAL LIMITS (WNL) UNLESS OTHERWISE STATED BELOW. ( ALL OF THE BELOW CATEGORIES OF THE MSE HAVE BEEN REVIEWED (reviewed 8/30/2018) AND UPDATED AS DEEMED APPROPRIATE ) General Presentation age appropriate and casually dressed, cooperative Orientation oriented to time, place and person Vital Signs  See below (reviewed 8/30/2018); Vital Signs (BP, Pulse, & Temp) are within normal limits if not listed below. Gait and Station Stable/steady, no ataxia Musculoskeletal System No extrapyramidal symptoms (EPS); no abnormal muscular movements or Tardive Dyskinesia (TD); muscle strength and tone are within normal limits Language No aphasia or dysarthria Speech:  normal pitch and normal volume Thought Processes logical; normal rate of thoughts; good abstract reasoning/computation Thought Associations goal directed Thought Content not internally preoccupied Suicidal Ideations none Homicidal Ideations none Mood:  euthymic Affect:  mood-congruent Memory recent  fair Memory remote:  fair Concentration/Attention:  hypervigilance Fund of Knowledge wnl Insight:  fair Reliability fair Judgment:  fair VITALS:    
Patient Vitals for the past 24 hrs: 
 Temp Pulse Resp BP SpO2  
08/30/18 2001 98.5 °F (36.9 °C) 65 16 130/73 -  
08/30/18 1545 97.7 °F (36.5 °C) 76 18 138/65 95 % 08/30/18 0820 98.7 °F (37.1 °C) 93 16 131/71 93 % Wt Readings from Last 3 Encounters:  
08/30/18 74.5 kg (164 lb 5 oz) Temp Readings from Last 3 Encounters:  
08/30/18 98.5 °F (36.9 °C) BP Readings from Last 3 Encounters:  
08/30/18 130/73 Pulse Readings from Last 3 Encounters:  
08/30/18 65 DATA LABORATORY DATA:(reviewed/updated 8/30/2018) Recent Results (from the past 24 hour(s)) GLUCOSE, POC Collection Time: 08/30/18  7:40 AM  
Result Value Ref Range Glucose (POC) 166 (H) 65 - 100 mg/dL Performed by Quita Hernandez GLUCOSE, POC Collection Time: 08/30/18  4:02 PM  
Result Value Ref Range Glucose (POC) 135 (H) 65 - 100 mg/dL Performed by Kina Hughes No results found for: VALF2, VALAC, VALP, VALPR, DS6, CRBAM, CRBAMP, CARB2, XCRBAM 
No results found for: LITHM  
RADIOLOGY REPORTS:(reviewed/updated 8/30/2018) No results found. MEDICATIONS ALL MEDICATIONS:  
Current Facility-Administered Medications Medication Dose Route Frequency  losartan (COZAAR) tablet 100 mg  100 mg Oral DAILY And  
 hydroCHLOROthiazide (HYDRODIURIL) tablet 25 mg  25 mg Oral DAILY  LORazepam (ATIVAN) tablet 0.5 mg  0.5 mg Oral Q6H PRN  
 metFORMIN (GLUCOPHAGE) tablet 1,000 mg  1,000 mg Oral DAILY WITH BREAKFAST  gabapentin (NEURONTIN) capsule 600 mg  600 mg Oral BID  citalopram (CELEXA) tablet 40 mg  40 mg Oral DAILY  ziprasidone (GEODON) 20 mg in sterile water (preservative free) 1 mL injection  20 mg IntraMUSCular BID PRN  
 OLANZapine (ZyPREXA) tablet 5 mg  5 mg Oral Q6H PRN  
 benztropine (COGENTIN) tablet 2 mg  2 mg Oral BID PRN  
 benztropine (COGENTIN) injection 2 mg  2 mg IntraMUSCular BID PRN  
 LORazepam (ATIVAN) injection 2 mg  2 mg IntraMUSCular Q4H PRN  
 zolpidem (AMBIEN) tablet 5 mg  5 mg Oral QHS PRN  
 acetaminophen (TYLENOL) tablet 650 mg  650 mg Oral Q4H PRN  
 magnesium hydroxide (MILK OF MAGNESIA) 400 mg/5 mL oral suspension 30 mL  30 mL Oral DAILY PRN  
 nicotine (NICODERM CQ) 21 mg/24 hr patch 1 Patch  1 Patch TransDERmal DAILY PRN  
 traZODone (DESYREL) tablet 50 mg  50 mg Oral QHS  metFORMIN (GLUCOPHAGE) tablet 500 mg  500 mg Oral QHS  glucose chewable tablet 16 g  4 Tab Oral PRN  
 dextrose (D50W) injection syrg 12.5-25 g  25-50 mL IntraVENous PRN  
  glucagon (GLUCAGEN) injection 1 mg  1 mg IntraMUSCular PRN  
 rivaroxaban (XARELTO) tablet 20 mg  20 mg Oral DAILY WITH DINNER  
 dilTIAZem CD (CARDIZEM CD) capsule 360 mg  360 mg Oral DAILY  loperamide (IMODIUM) capsule 4 mg  4 mg Oral Q6H PRN  
 insulin lispro (HUMALOG) injection   SubCUTAneous BID  
  
SCHEDULED MEDICATIONS:  
Current Facility-Administered Medications Medication Dose Route Frequency  losartan (COZAAR) tablet 100 mg  100 mg Oral DAILY And  
 hydroCHLOROthiazide (HYDRODIURIL) tablet 25 mg  25 mg Oral DAILY  metFORMIN (GLUCOPHAGE) tablet 1,000 mg  1,000 mg Oral DAILY WITH BREAKFAST  gabapentin (NEURONTIN) capsule 600 mg  600 mg Oral BID  citalopram (CELEXA) tablet 40 mg  40 mg Oral DAILY  traZODone (DESYREL) tablet 50 mg  50 mg Oral QHS  metFORMIN (GLUCOPHAGE) tablet 500 mg  500 mg Oral QHS  rivaroxaban (XARELTO) tablet 20 mg  20 mg Oral DAILY WITH DINNER  
 dilTIAZem CD (CARDIZEM CD) capsule 360 mg  360 mg Oral DAILY  insulin lispro (HUMALOG) injection   SubCUTAneous BID ASSESSMENT & PLAN  
 
DIAGNOSES REQUIRING ACTIVE TREATMENT AND MONITORING: (reviewed/updated 8/30/2018) Patient Active Hospital Problem List: 
 Major depressive disorder (8/29/2018) Assessment: moderate to severe Plan: Agree with inpatient hospitalization for further stabilization, safety monitoring and medication management Medications- continue increased dose of Celexa to 40mg daily Provided supportive psychotherapy Type 2 diabetes mellitus ([facility] [address] (8/29/2018) Assessment: chronic Plan: continue to monitor glc Continue current regimen I will continue to monitor blood levels (Depakote, Tegretol, lithium, clozapine---a drug with a narrow therapeutic index= NTI) and associated labs for drug therapy implemented that require intense monitoring for toxicity as deemed appropriate based on current medication side effects and pharmacodynamically determined drug 1/2 lives. In summary, Guera Devine, is a 69 y.o.  female who presents with a severe exacerbation of the principal diagnosis of Major depressive disorder Patient's condition is mproving. Patient requires continued inpatient hospitalization for further stabilization, safety monitoring and medication management. I will continue to coordinate the provision of individual, milieu, occupational, group, and substance abuse therapies to address target symptoms/diagnoses as deemed appropriate for the individual patient. A coordinated, multidisplinary treatment team round was conducted with the patient (this team consists of the nurse, psychiatric unit pharmcist,  and writer). Complete current electronic health record for patient has been reviewed today including consultant notes, ancillary staff notes, nurses and psychiatric tech notes. Suicide risk assessment completed and patient deemed to be of low risk for suicide at this time. The following regarding medications was addressed during rounds with patient:  
the risks and benefits of the proposed medication. The patient was given the opportunity to ask questions. Informed consent given to the use of the above medications. Will continue to adjust psychiatric and non-psychiatric medications (see above \"medication\" section and orders section for details) as deemed appropriate & based upon diagnoses and response to treatment. I will continue to order blood tests/labs and diagnostic tests as deemed appropriate and review results as they become available (see orders for details and above listed lab/test results). I will order psychiatric records from previous [facility] hospitals to further elucidate the nature of patient's psychopathology and review once available.  
 
I will gather additional collateral information from friends, family and o/p treatment team to further elucidate the nature of patient's psychopathology and baselline level of psychiatric functioning. I certify that this patient's inpatient psychiatric hospital services furnished since the previous certification were, and continue to be, required for treatment that could reasonably be expected to improve the patient's condition, or for diagnostic study, and that the patient continues to need, on a daily basis, active treatment furnished directly by or requiring the supervision of inpatient psychiatric facility personnel. In addition, the hospital records show that services furnished were intensive treatment services, admission or related services, or equivalent services. EXPECTED DISCHARGE DATE/DAY: TBD  
 
DISPOSITION: Home Signed By:  
Cesario Hayes MD 
8/30/2018

## 2018-08-31 NOTE — INTERDISCIPLINARY ROUNDS
Behavioral Health Interdisciplinary Rounds Patient Name: Chau Avendano  Age: 69 y.o. Room/Bed:  725/02 Primary Diagnosis: Major depressive disorder Admission Status: Involuntary Commitment Readmission within 30 days: no 
Power of  in place: no 
Patient requires a blocked bed: no          Reason for blocked bed: n/a 
 
VTE Prophylaxis: Not indicated Mobility needs/Fall risk: no   
Nutritional Plan: no 
Consults: no        
Labs/Testing due today?: no 
 
Sleep hours:  7 Participation in Care/Groups:  yes Medication Compliant?: Yes PRNS (last 24 hours): None Restraints (last 24 hours):  no 
  
CIWA (range last 24 hours): COWS (range last 24 hours): Alcohol screening (AUDIT) completed -   AUDIT Score: 0 If applicable, date SBIRT discussed in treatment team AND documented:  
 
Tobacco - patient is a smoker: Have You Used Tobacco in the Past 30 Days: No 
Illegal Drugs use: Have You Used Any Illegal Substances Over the Past 12 Months: No 
 
24 hour chart check complete: yes Patient goal(s) for today: Meet with treatment team; call family Treatment team focus/goals: Encourage Pt to engage in therapeutic activities and groups on the unit Progress note: Patient states she thought her son would have called by now, but is resistant to calling him. LOS:  2  Expected LOS: TBD Financial concerns/prescription coverage:  Medicare Date of last family contact:     
Family requesting physician contact today:  No 
Discharge plan: Return home when stable for discharge Guns in the home:  Yes Outpatient provider(s): To be linked Participating treatment team members: Chau Nikia Avendano MSW; Dr. Ab Guzman MD; Noemí Spencer RN; Jorge Bear, ChristinaD

## 2018-08-31 NOTE — PROGRESS NOTES
Problem: Depressed Mood (Adult/Pediatric) Goal: *STG: Participates in treatment plan Outcome: Progressing Towards Goal 
Out on unit social w peers and staff. Mood and affect brighter and engaged. Denies SI, no self harming behaviors. Thoughts organized, future focused and goal directed. Daily goal is to work on coping and sharing grief. Staff focus is on offering support and reassurance

## 2018-08-31 NOTE — PROGRESS NOTES
Problem: Falls - Risk of 
Goal: *Absence of Falls Document Edlima Boswell Fall Risk and appropriate interventions in the flowsheet. Outcome: Progressing Towards Goal 
Fall Risk Interventions: 
  
 
  
 
Medication Interventions: Teach patient to arise slowly History of Falls Interventions: Door open when patient unattended Received pt in bed resting. Pt appears to be in no distress. Respirations even and unlabored. Continuing to monitor pt with q15 min safety rounds.

## 2018-08-31 NOTE — BH NOTES
GROUP THERAPY PROGRESS NOTE Hetal Lazcanomanoharced Allison is participating in [employer]. Group time: 15 minutes Personal goal for participation: decrease anxiety Goal orientation: relaxation Group therapy participation: active Therapeutic interventions reviewed and discussed:  
 
Impression of participation: good
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
## 2018-09-01 LAB
GLUCOSE BLD STRIP.AUTO-MCNC: 117 MG/DL (ref 65–100)
GLUCOSE BLD STRIP.AUTO-MCNC: 128 MG/DL (ref 65–100)
SERVICE CMNT-IMP: ABNORMAL
SERVICE CMNT-IMP: ABNORMAL
T4 SERPL-MCNC: 11.1 UG/DL (ref 4.8–13.9)

## 2018-09-01 PROCEDURE — 65220000003 HC RM SEMIPRIVATE PSYCH

## 2018-09-01 PROCEDURE — 74011250637 HC RX REV CODE- 250/637

## 2018-09-01 PROCEDURE — 74011250637 HC RX REV CODE- 250/637: Performed by: PSYCHIATRY & NEUROLOGY

## 2018-09-01 PROCEDURE — 82962 GLUCOSE BLOOD TEST: CPT

## 2018-09-01 RX ADMIN — TRAZODONE HYDROCHLORIDE 50 MG: 50 TABLET ORAL at 20:48

## 2018-09-01 RX ADMIN — RIVAROXABAN 20 MG: 20 TABLET, FILM COATED ORAL at 17:03

## 2018-09-01 RX ADMIN — DILTIAZEM HYDROCHLORIDE 360 MG: 180 CAPSULE, COATED, EXTENDED RELEASE ORAL at 08:50

## 2018-09-01 RX ADMIN — HYDROCHLOROTHIAZIDE 25 MG: 25 TABLET ORAL at 08:50

## 2018-09-01 RX ADMIN — METFORMIN HYDROCHLORIDE 500 MG: 500 TABLET ORAL at 20:48

## 2018-09-01 RX ADMIN — GABAPENTIN 600 MG: 300 CAPSULE ORAL at 17:03

## 2018-09-01 RX ADMIN — CITALOPRAM HYDROBROMIDE 40 MG: 20 TABLET ORAL at 08:50

## 2018-09-01 RX ADMIN — GABAPENTIN 600 MG: 300 CAPSULE ORAL at 08:50

## 2018-09-01 RX ADMIN — LOSARTAN POTASSIUM 100 MG: 50 TABLET ORAL at 08:49

## 2018-09-01 RX ADMIN — METFORMIN HYDROCHLORIDE 1000 MG: 500 TABLET ORAL at 08:49
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
## 2018-09-01 NOTE — BH NOTES
GROUP THERAPY PROGRESS NOTE Ponce Allison is participating in Sitting Group Exercises. Group time: 30mins; 3030-5183 Personal goal for participation: Participation on all exercises; as well as alternatives if need be. Goal orientation: Exercises were provided as well as alternatives if the patient needed it. Patients participated at their own individual levels and relaxed when need be. A 2 minute break was given at the hallway point. Group therapy participation: Participated Fully Therapeutic interventions reviewed and discussed: Exercises lead to better health and better sleep. Impression of participation: Enjoyed the music and sitting exercises in a group.

## 2018-09-01 NOTE — PROGRESS NOTES
1541 Problem: Depressed Mood (Adult/Pediatric) Goal: *STG: Verbalizes anger, guilt, and other feelings in a constructive manor Outcome: Progressing Towards Goal 
Received pt up in hallway. She is anxious, ringing her hands and fearful. She asks nurse \"if my son wants to have a meeting with the doctor. . That doesn't mean he can have me committed and put me away does it. Ralph Crawford \"  Pt ambulates steadily on unit and is alert, oriented X4, denies SI, shared difficulty she has had with losing her , financial problems and relationship issues with son and daughter in law. Staff give emotional support and encourage her to share feelings. Continue q15 checks. 1650 seated with the other ladies at the dining room table talking and laughing.

## 2018-09-01 NOTE — PROGRESS NOTES
Problem: Depressed Mood (Adult/Pediatric) Goal: *STG: Attends activities and groups Outcome: Progressing Towards Goal 
Patient participates in activities and groups. Patient is euthymic, affect congruent, out on the unit, appropriate with staff and peers, med and meal compliant, organized thoughts, fair insight.

## 2018-09-01 NOTE — BH NOTES
GROUP THERAPY PROGRESS NOTE Carlos Alberto Platt is participating in [place] Meeting/Relaxation. Group time: Time; 15 min Personal goal for participation: Going over [employer] Goal orientation:Group Orientation Group therapy participation: Participated by asking questions, and discussing topics Therapeutic interventions reviewed and discussed: Rules of the Floor Impression of participation: Did well participating while with the group.

## 2018-09-01 NOTE — BH NOTES
GROUP THERAPY PROGRESS NOTE Bill Allison is participating in Relaxation group. Group time: 30 minutes Personal goal for participation: share with group relaxation techniques and participate in guided imagery to prepare for bedtime Goal orientation: relaxation Group therapy participation: active Therapeutic interventions reviewed and discussed: relaxation techniques. Share +coping skills one can do to promote rest 
 
Impression of participation: pt shares that she would like to be at a beach listening to the waves and relaxing. Pt was supportive of peers during group.

## 2018-09-01 NOTE — PROGRESS NOTES
Problem: Falls - Risk of 
Goal: *Absence of Falls Document Mykel Cardona Fall Risk and appropriate interventions in the flowsheet. Outcome: Progressing Towards Goal 
Fall Risk Interventions: 
  
 
  
 
Medication Interventions: Teach patient to arise slowly History of Falls Interventions: Door open when patient unattended Received pt in bed resting. Pt appears to be in no distress. Respirations even and unlabored. Continuing to monitor pt with q15 min safety rounds.

## 2018-09-01 NOTE — INTERDISCIPLINARY ROUNDS
Behavioral Health Interdisciplinary Rounds  
  
Patient Name: Julee Lott                                      Age: 69 y.o. Room/Bed:  725/02 Primary Diagnosis: Major depressive disorder Admission Status: Involuntary Commitment Readmission within 30 days: no 
Power of  in place: no 
Patient requires a blocked bed: no          Reason for blocked bed: n/a 
  
VTE Prophylaxis: Not indicated 
  
Mobility needs/Fall risk: no                                      
Nutritional Plan: no 
Consults: no                                                                                                                             
Labs/Testing due today?: T4 
  
Sleep hours:         6 Participation in Care/Groups:  yes Medication Compliant?: Yes PRNS (last 24 hours): None Restraints (last 24 hours):  no 
   
CIWA (range last 24 hours): COWS (range last 24 hours):   
  
Alcohol screening (AUDIT) completed -   AUDIT Score: 0 If applicable, date SBIRT discussed in treatment team AND documented:  
  
Tobacco - patient is a smoker: Have You Used Tobacco in the Past 30 Days: No 
Illegal Drugs use: Have You Used Any Illegal Substances Over the Past 12 Months: No 
  
24 hour chart check complete: yes  
  
Patient goal(s) for today:  
Treatment team focus/goals: Continued compliance with medications and program requirement. Progress note: Patient states that she is \"feeling great\" Denies any further suicidal thoughts. Spoke to son yesterday. Son called unit and asked to have a family meeting. Meeting tentative for Tuesday. 
  
LOS:  2                                            Expected LOS: TBD 
  
Financial concerns/prescription coverage:   Yes 
 Date of last family contact:       8/31/18 Family requesting physician contact today: Yes Discharge plan: TBD Guns in the home: Yes Outpatient provider(s):  
  
Participating treatment team members: Arnol Cai; Waymon Sacks, MSW; Dr. Tank Castillo, RN

## 2018-09-02 LAB
ANION GAP SERPL CALC-SCNC: 10 MMOL/L (ref 5–15)
BUN SERPL-MCNC: 17 MG/DL (ref 6–20)
BUN/CREAT SERPL: 15 (ref 12–20)
CALCIUM SERPL-MCNC: 9 MG/DL (ref 8.5–10.1)
CHLORIDE SERPL-SCNC: 100 MMOL/L (ref 97–108)
CO2 SERPL-SCNC: 27 MMOL/L (ref 21–32)
CREAT SERPL-MCNC: 1.1 MG/DL (ref 0.55–1.02)
ERYTHROCYTE [DISTWIDTH] IN BLOOD BY AUTOMATED COUNT: 13 % (ref 11.5–14.5)
GLUCOSE BLD STRIP.AUTO-MCNC: 140 MG/DL (ref 65–100)
GLUCOSE BLD STRIP.AUTO-MCNC: 95 MG/DL (ref 65–100)
GLUCOSE SERPL-MCNC: 215 MG/DL (ref 65–100)
HCT VFR BLD AUTO: 42.8 % (ref 35–47)
HGB BLD-MCNC: 14.1 G/DL (ref 11.5–16)
MCH RBC QN AUTO: 31.4 PG (ref 26–34)
MCHC RBC AUTO-ENTMCNC: 32.9 G/DL (ref 30–36.5)
MCV RBC AUTO: 95.3 FL (ref 80–99)
NRBC # BLD: 0 K/UL (ref 0–0.01)
NRBC BLD-RTO: 0 PER 100 WBC
PLATELET # BLD AUTO: 290 K/UL (ref 150–400)
PMV BLD AUTO: 10.7 FL (ref 8.9–12.9)
POTASSIUM SERPL-SCNC: 3.6 MMOL/L (ref 3.5–5.1)
RBC # BLD AUTO: 4.49 M/UL (ref 3.8–5.2)
SERVICE CMNT-IMP: ABNORMAL
SERVICE CMNT-IMP: NORMAL
SODIUM SERPL-SCNC: 137 MMOL/L (ref 136–145)
WBC # BLD AUTO: 9.5 K/UL (ref 3.6–11)

## 2018-09-02 PROCEDURE — 36415 COLL VENOUS BLD VENIPUNCTURE: CPT

## 2018-09-02 PROCEDURE — 74011250637 HC RX REV CODE- 250/637

## 2018-09-02 PROCEDURE — 85027 COMPLETE CBC AUTOMATED: CPT

## 2018-09-02 PROCEDURE — 65220000003 HC RM SEMIPRIVATE PSYCH

## 2018-09-02 PROCEDURE — 80048 BASIC METABOLIC PNL TOTAL CA: CPT

## 2018-09-02 PROCEDURE — 82962 GLUCOSE BLOOD TEST: CPT

## 2018-09-02 PROCEDURE — 74011250637 HC RX REV CODE- 250/637: Performed by: PSYCHIATRY & NEUROLOGY

## 2018-09-02 RX ADMIN — TRAZODONE HYDROCHLORIDE 50 MG: 50 TABLET ORAL at 21:22

## 2018-09-02 RX ADMIN — METFORMIN HYDROCHLORIDE 1000 MG: 500 TABLET ORAL at 09:06

## 2018-09-02 RX ADMIN — CITALOPRAM HYDROBROMIDE 40 MG: 20 TABLET ORAL at 09:05

## 2018-09-02 RX ADMIN — LOSARTAN POTASSIUM 100 MG: 50 TABLET ORAL at 09:06

## 2018-09-02 RX ADMIN — DILTIAZEM HYDROCHLORIDE 360 MG: 180 CAPSULE, COATED, EXTENDED RELEASE ORAL at 09:06

## 2018-09-02 RX ADMIN — METFORMIN HYDROCHLORIDE 500 MG: 500 TABLET ORAL at 21:22

## 2018-09-02 RX ADMIN — GABAPENTIN 600 MG: 300 CAPSULE ORAL at 16:52

## 2018-09-02 RX ADMIN — RIVAROXABAN 20 MG: 20 TABLET, FILM COATED ORAL at 16:52

## 2018-09-02 RX ADMIN — HYDROCHLOROTHIAZIDE 25 MG: 25 TABLET ORAL at 09:05

## 2018-09-02 RX ADMIN — GABAPENTIN 600 MG: 300 CAPSULE ORAL at 09:07

## 2018-09-02 NOTE — PROGRESS NOTES
Problem: Depressed Mood (Adult/Pediatric) Goal: *STG: Participates in treatment plan Outcome: Progressing Towards Goal 
Pt alert and oriented. Calm cooperative. Anxious. Blunted affect. Participating in activities. NAD. Medication and meal compliant. Visible on the unit appropriate interactions with peers and staff. Pt verbalizing concern related to disruptive behavior of peer in dining room. Conflict resolution focus of discussing, positive reinforcement provided.

## 2018-09-02 NOTE — PROGRESS NOTES
Problem: Depressed Mood (Adult/Pediatric) Goal: *STG: Attends activities and groups Outcome: Progressing Towards Goal 
Patient attends activities and groups, out on the unit, appropriate with peers. Patient is anxious, participating in treatment planning, med and meal compliant with the exception of insulin. Remains on Q15 min safety checks.

## 2018-09-02 NOTE — BH NOTES
7608: Patient has labs due this morning. This writer and another nurse have tried to get labs with no success. ICU advised they will send a nurse over to try, and patient is agreeable to trying again. 0900:  ICU nurse unsuccessful in getting labs. IV team contacted, someone will come up to try. 0915:  Patient is agreeable to let IV team get labs.

## 2018-09-02 NOTE — BH NOTES
PSYCHIATRIC PROGRESS NOTE Patient Name  Anali Cohn Date of Birth 1950 [facility] [id number] Medical Record Number  [id number] Age  69 y.o. PCP Max Laird MD  
Admit date:  8/29/2018 Room Number  725/02  @ [facility]  
Date of Service  9/2/2018 PSYCHOTHERAPY SESSION NOTE: 
Length of psychotherapy session: 20 minutes Main condition/diagnosis/issues treated during session today, 9/2/2018 : familial discord, financial stressors, depression I employed Cognitive Behavioral therapy techniques, Reality-Oriented psychotherapy, as well as supportive psychotherapy in regards to various ongoing psychosocial stressors, including the following: pre-admission and current problems; housing issues; occupational issues; academic issues; legal issues; medical issues; and stress of hospitalization. Interpersonal relationship issues and psychodynamic conflicts explored. Attempts made to alleviate maladaptive patterns. We, also, worked on issues of denial & effects of substance dependency/use Overall, patient is not progressing Treatment Plan Update (reviewed an updated 9/2/2018) : I will modify psychotherapy tx plan by implementing more stress management strategies, building upon cognitive behavioral techniques, increasing coping skills, as well as shoring up psychological defenses). An extended energy and skill set was needed to engage pt in psychotherapy due to some of the following: resistiveness, complexity, negativity, confrontational nature, hostile behaviors, and/or severe abnormalities in thought processes/psychosis resulting in the loss of expressive/receptive language communication skills. E & M PROGRESS NOTE: 
  
 
 
HISTORY  
   
CC:  \"Depression\" HISTORY OF PRESENT ILLNESS/INTERVAL HISTORY:  (reviewed/updated 9/2/2018). per initial evaluation: The patient, Anali Cohn, is a 69 y.o.   WHITE [place]  female with a past psychiatric history significant for MDD, past psychiatric hospitalization, who presents at this time with complaints of (and/or evidence of) the following emotional symptoms: depression, suicidal thoughts/threats and anger ,grief and insomnia. .  Additional symptomatology include death of her  one month ago, son seizing all of the money  In her husbands accounts and inability to pay all of her bills. She was angry with her son and decided to hold a gun to her head in front of her son. She says that she feels that her life is worthless, since she cannot pay all of her bills. Taking celexa rx'd by her pcp. .  The above symptoms have been present for several weeks to years. These symptoms are of high severity. These symptoms are constant in nature. The patient's condition has been precipitated by grief, conflict with son, and psychosocial stressors Serra Yvesmakennabrandon Allison presents/reports/evidences the following emotional symptoms today, 9/2/2018:depression and suicidal thoughts/threats. The above symptoms have been present for several days to weeks. These symptoms are of  Moderate, improving. . The symptoms are constant in nature. Additional symptomatology and features include low mood, irritability, anxiety. Sleep and appetite are improved. Deneis Suicidal thoughts. 09/2/18: Seen today, she remain anxious,she is complaint with med except Insulin,sleep and appetite is good. Received no prn. Mood She reported \"Santamaria good\". Denies suicidal thoughts,plan or intent. No psychotic or manic symptoms SIDE EFFECTS: (reviewed/updated 9/2/2018) None reported or admitted to. ALLERGIES:(reviewed/updated 9/2/2018) Allergies Allergen Reactions  Robaxin [Methocarbamol] Anaphylaxis  Morphine Itching MEDICATIONS PRIOR TO ADMISSION:(reviewed/updated 9/2/2018) Prescriptions Prior to Admission Medication Sig  
 citalopram (CELEXA) 20 mg tablet Take 20 mg by mouth daily. Indications: major depressive disorder  dilTIAZem (TIAZAC) 360 mg ER capsule Take 360 mg by mouth daily.  furosemide (LASIX) 20 mg tablet Take 20 mg by mouth daily as needed.  rivaroxaban (XARELTO) 20 mg tab tablet Take 20 mg by mouth daily.  traZODone (DESYREL) 50 mg tablet Take 50 mg by mouth nightly. Indications: insomnia associated with depression  valsartan-hydroCHLOROthiazide (DIOVAN-HCT) 160-25 mg per tablet Take 1 Tab by mouth daily. Indications: hypertension  metFORMIN (GLUCOPHAGE) 500 mg tablet Take 1000mg every morning and 500mg every evening  Indications: type 2 diabetes mellitus  gabapentin (NEURONTIN) 300 mg capsule Take 300 mg by mouth two (2) times a day. PAST MEDICAL HISTORY: Past medical history from the initial psychiatric evaluation has been reviewed (reviewed/updated 9/2/2018) with no additional updates (I asked patient and no additional past medical history provided). No past medical history on file. No past surgical history on file. SOCIAL HISTORY: Social history from the initial psychiatric evaluation has been reviewed (reviewed/updated 9/2/2018) with no additional updates (I asked patient and no additional social history provided). Social History Social History  Marital status:  Spouse name: N/A  
 Number of children: N/A  
 Years of education: N/A Occupational History  Not on file. Social History Main Topics  Smoking status: Not on file  Smokeless tobacco: Not on file  Alcohol use Not on file  Drug use: Not on file  Sexual activity: Not on file Other Topics Concern  Not on file Social History Narrative FAMILY HISTORY: Family history from the initial psychiatric evaluation has been reviewed (reviewed/updated 9/2/2018) with no additional updates (I asked patient and no additional family history provided). No family history on file. REVIEW OF SYSTEMS: (reviewed/updated 9/2/2018) Appetite:good Sleep: good All other Review of Systems: depressed mood and anxiety HEENT: headache, neck stiffness Resp: (-)sob [place]: (-)cp [place] MENTAL STATUS EXAM (MSE): MSE FINDINGS ARE WITHIN NORMAL LIMITS (WNL) UNLESS OTHERWISE STATED BELOW. ( ALL OF THE BELOW CATEGORIES OF THE MSE HAVE BEEN REVIEWED (reviewed 9/2/2018) AND UPDATED AS DEEMED APPROPRIATE ) General Presentation age appropriate and casually dressed, cooperative Orientation oriented to time, place and person Vital Signs  See below (reviewed 9/2/2018); Vital Signs (BP, Pulse, & Temp) are within normal limits if not listed below. Gait and Station Stable/steady, no ataxia Musculoskeletal System No extrapyramidal symptoms (EPS); no abnormal muscular movements or Tardive Dyskinesia (TD); muscle strength and tone are within normal limits Language No aphasia or dysarthria Speech:  normal pitch and normal volume Thought Processes logical; normal rate of thoughts; good abstract reasoning/computation Thought Associations goal directed Thought Content not internally preoccupied Suicidal Ideations none Homicidal Ideations none Mood:  euthymic Affect:  mood-congruent Memory recent  fair Memory remote:  fair Concentration/Attention:  hypervigilance Fund of Knowledge wnl Insight:  fair Reliability fair Judgment:  fair VITALS:    
Patient Vitals for the past 24 hrs: 
 Temp Pulse Resp BP SpO2  
09/02/18 0856 97.8 °F (36.6 °C) 90 16 137/65 95 % 09/01/18 1943 98.1 °F (36.7 °C) 78 18 126/71 -  
09/01/18 1615 97.9 °F (36.6 °C) 96 16 119/74 97 % Wt Readings from Last 3 Encounters:  
08/30/18 74.5 kg (164 lb 5 oz) Temp Readings from Last 3 Encounters: 09/02/18 97.8 °F (36.6 °C) BP Readings from Last 3 Encounters:  
09/02/18 137/65 Pulse Readings from Last 3 Encounters:  
09/02/18 90 DATA LABORATORY DATA:(reviewed/updated 9/2/2018) Recent Results (from the past 24 hour(s)) GLUCOSE, POC Collection Time: 09/01/18  4:24 PM  
Result Value Ref Range Glucose (POC) 117 (H) 65 - 100 mg/dL Performed by Lester Phelps) MAHNAZ   
GLUCOSE, POC Collection Time: 09/02/18  7:41 AM  
Result Value Ref Range Glucose (POC) 140 (H) 65 - 100 mg/dL Performed by Memo Winston CBC W/O DIFF Collection Time: 09/02/18  9:18 AM  
Result Value Ref Range WBC 9.5 3.6 - 11.0 K/uL  
 RBC 4.49 3.80 - 5.20 M/uL  
 HGB 14.1 11.5 - 16.0 g/dL HCT 42.8 35.0 - 47.0 % MCV 95.3 80.0 - 99.0 FL  
 MCH 31.4 26.0 - 34.0 PG  
 MCHC 32.9 30.0 - 36.5 g/dL  
 RDW 13.0 11.5 - 14.5 % PLATELET 286 322 - 781 K/uL MPV 10.7 8.9 - 12.9 FL  
 NRBC 0.0 0  WBC ABSOLUTE NRBC 0.00 0.00 - 0.01 K/uL METABOLIC PANEL, BASIC Collection Time: 09/02/18  9:18 AM  
Result Value Ref Range Sodium 137 136 - 145 mmol/L Potassium 3.6 3.5 - 5.1 mmol/L Chloride 100 97 - 108 mmol/L  
 CO2 27 21 - 32 mmol/L Anion gap 10 5 - 15 mmol/L Glucose 215 (H) 65 - 100 mg/dL BUN 17 6 - 20 MG/DL Creatinine 1.10 (H) 0.55 - 1.02 MG/DL  
 BUN/Creatinine ratio 15 12 - 20 GFR est AA 60 (L) >60 ml/min/1.73m2 GFR est non-AA 49 (L) >60 ml/min/1.73m2 Calcium 9.0 8.5 - 10.1 MG/DL No results found for: VALF2, VALAC, VALP, VALPR, DS6, CRBAM, CRBAMP, CARB2, XCRBAM 
No results found for: LITHM  
RADIOLOGY REPORTS:(reviewed/updated 9/2/2018) No results found. MEDICATIONS ALL MEDICATIONS:  
Current Facility-Administered Medications Medication Dose Route Frequency  insulin lispro (HUMALOG) injection   SubCUTAneous BID WITH MEALS  
 losartan (COZAAR) tablet 100 mg  100 mg Oral DAILY  And  
  hydroCHLOROthiazide (HYDRODIURIL) tablet 25 mg  25 mg Oral DAILY  LORazepam (ATIVAN) tablet 0.5 mg  0.5 mg Oral Q6H PRN  
 metFORMIN (GLUCOPHAGE) tablet 1,000 mg  1,000 mg Oral DAILY WITH BREAKFAST  gabapentin (NEURONTIN) capsule 600 mg  600 mg Oral BID  citalopram (CELEXA) tablet 40 mg  40 mg Oral DAILY  ziprasidone (GEODON) 20 mg in sterile water (preservative free) 1 mL injection  20 mg IntraMUSCular BID PRN  
 OLANZapine (ZyPREXA) tablet 5 mg  5 mg Oral Q6H PRN  
 benztropine (COGENTIN) tablet 2 mg  2 mg Oral BID PRN  
 benztropine (COGENTIN) injection 2 mg  2 mg IntraMUSCular BID PRN  
 LORazepam (ATIVAN) injection 2 mg  2 mg IntraMUSCular Q4H PRN  
 zolpidem (AMBIEN) tablet 5 mg  5 mg Oral QHS PRN  
 acetaminophen (TYLENOL) tablet 650 mg  650 mg Oral Q4H PRN  
 magnesium hydroxide (MILK OF MAGNESIA) 400 mg/5 mL oral suspension 30 mL  30 mL Oral DAILY PRN  
 nicotine (NICODERM CQ) 21 mg/24 hr patch 1 Patch  1 Patch TransDERmal DAILY PRN  
 traZODone (DESYREL) tablet 50 mg  50 mg Oral QHS  metFORMIN (GLUCOPHAGE) tablet 500 mg  500 mg Oral QHS  glucose chewable tablet 16 g  4 Tab Oral PRN  
 dextrose (D50W) injection syrg 12.5-25 g  25-50 mL IntraVENous PRN  
 glucagon (GLUCAGEN) injection 1 mg  1 mg IntraMUSCular PRN  
 rivaroxaban (XARELTO) tablet 20 mg  20 mg Oral DAILY WITH DINNER  
 dilTIAZem CD (CARDIZEM CD) capsule 360 mg  360 mg Oral DAILY  loperamide (IMODIUM) capsule 4 mg  4 mg Oral Q6H PRN  
  
SCHEDULED MEDICATIONS:  
Current Facility-Administered Medications Medication Dose Route Frequency  insulin lispro (HUMALOG) injection   SubCUTAneous BID WITH MEALS  
 losartan (COZAAR) tablet 100 mg  100 mg Oral DAILY And  
 hydroCHLOROthiazide (HYDRODIURIL) tablet 25 mg  25 mg Oral DAILY  metFORMIN (GLUCOPHAGE) tablet 1,000 mg  1,000 mg Oral DAILY WITH BREAKFAST  gabapentin (NEURONTIN) capsule 600 mg  600 mg Oral BID  
  citalopram (CELEXA) tablet 40 mg  40 mg Oral DAILY  traZODone (DESYREL) tablet 50 mg  50 mg Oral QHS  metFORMIN (GLUCOPHAGE) tablet 500 mg  500 mg Oral QHS  rivaroxaban (XARELTO) tablet 20 mg  20 mg Oral DAILY WITH DINNER  
 dilTIAZem CD (CARDIZEM CD) capsule 360 mg  360 mg Oral DAILY ASSESSMENT & PLAN  
 
DIAGNOSES REQUIRING ACTIVE TREATMENT AND MONITORING: (reviewed/updated 9/2/2018) Patient Active Hospital Problem List: 
 Major depressive disorder (8/29/2018) Assessment: moderate to severe Plan: Agree with inpatient hospitalization for further stabilization, safety monitoring and medication management Medications- continue increased dose of Celexa to 40mg daily Provided supportive psychotherapy 09/1/18: Continue current treatment. 9/2/18: Continue current treatment. Type 2 diabetes mellitus ([facility] [address] (8/29/2018) Assessment: chronic Plan: continue to monitor glc Continue current regimen I will continue to monitor blood levels (Depakote, Tegretol, lithium, clozapine---a drug with a narrow therapeutic index= NTI) and associated labs for drug therapy implemented that require intense monitoring for toxicity as deemed appropriate based on current medication side effects and pharmacodynamically determined drug 1/2 lives. In summary, Julee Lott, is a 69 y.o.  female who presents with a severe exacerbation of the principal diagnosis of Major depressive disorder Patient's condition is mproving. Patient requires continued inpatient hospitalization for further stabilization, safety monitoring and medication management. I will continue to coordinate the provision of individual, milieu, occupational, group, and substance abuse therapies to address target symptoms/diagnoses as deemed appropriate for the individual patient.   A coordinated, multidisplinary treatment team round was conducted with the patient (this team consists of the nurse, psychiatric unit pharmcist,  and writer). Complete current electronic health record for patient has been reviewed today including consultant notes, ancillary staff notes, nurses and psychiatric tech notes. Suicide risk assessment completed and patient deemed to be of low risk for suicide at this time. The following regarding medications was addressed during rounds with patient:  
the risks and benefits of the proposed medication. The patient was given the opportunity to ask questions. Informed consent given to the use of the above medications. Will continue to adjust psychiatric and non-psychiatric medications (see above \"medication\" section and orders section for details) as deemed appropriate & based upon diagnoses and response to treatment. I will continue to order blood tests/labs and diagnostic tests as deemed appropriate and review results as they become available (see orders for details and above listed lab/test results). I will order psychiatric records from previous [facility] hospitals to further elucidate the nature of patient's psychopathology and review once available. I will gather additional collateral information from friends, family and o/p treatment team to further elucidate the nature of patient's psychopathology and baselline level of psychiatric functioning. I certify that this patient's inpatient psychiatric hospital services furnished since the previous certification were, and continue to be, required for treatment that could reasonably be expected to improve the patient's condition, or for diagnostic study, and that the patient continues to need, on a daily basis, active treatment furnished directly by or requiring the supervision of inpatient psychiatric facility personnel. In addition, the hospital records show that services furnished were intensive treatment services, admission or related services, or equivalent services.  
 
EXPECTED DISCHARGE DATE/DAY: TBD  
 
 DISPOSITION: Home Signed By:  
Lindsey Yeager MD 
9/2/2018

## 2018-09-02 NOTE — INTERDISCIPLINARY ROUNDS
[address] 
Patient Name: Doreen Lazaro: 69 y.o.                                     Room/Bed:  725/02 Primary Diagnosis: Major depressive disorder             
Admission Status: Involuntary Commitment                                                         
Readmission within 30 days: no 
Power of  in place: no 
Patient requires a blocked bed: no          Reason for blocked bed: n/a 
   
VTE Prophylaxis: Not indicated 
   
Mobility needs/Fall risk: no                                      
Nutritional Plan: no 
Consults: no                                                                                                                             
Labs/Testing due today?: None 
   
Sleep hours:           7                                                                                 
Participation in Care/Groups:  yes Medication Compliant?: Yes PRNS (last 24 hours): RAWH                                    
Restraints (last 24 hours):  no 
   
CIWA (range last 24 hours):              
COWS (range last 24 hours):   
   
Alcohol screening (AUDIT) completed -   AUDIT Score: 0    
If applicable, date SBIRT discussed in treatment team AND documented:  
   
Tobacco - patient is a smoker: Have You Used Tobacco in the Past 30 Days: No 
Illegal Drugs use: Have You Used Any Illegal Substances Over the Past 12 Months: No 
   
24 hour chart check complete: yes  
   
Patient goal(s) for today:  
Treatment team focus/goals: . Progress note: LOS:  2                                            Expected LOS: TBD 
   
Financial concerns/prescription coverage: [employer] Date of last family contact:       8/31/18                                                     
Family requesting physician contact today: Yes Discharge plan: TBD G[facility] in the home: [facility]                                       
Outpatient provider(s):  
   
Participating treatment team members: Higinio Schmitz; JOSE F Wyman; Dr. William Velasquez RN

## 2018-09-02 NOTE — PROGRESS NOTES
Problem: Falls - Risk of 
Goal: *Absence of Falls Document Ezra Galindo Fall Risk and appropriate interventions in the flowsheet. Outcome: Progressing Towards Goal 
Fall Risk Interventions: 
  
 
  
 
Medication Interventions: Teach patient to arise slowly History of Falls Interventions: Door open when patient unattended Received pt in bed resting. Pt appears to be in no distress. Respirations even and unlabored. Continuing to monitor pt with q15 min safety rounds.

## 2018-09-02 NOTE — BH NOTES
GROUP THERAPY PROGRESS NOTE Chau Avendano is participating in Reflection/Relaxation. Group time: 30min: [id number] Personal goal for participation: Rest and Relaxation Goal orientation: DI orientation then broke up to go relax individually Group therapy participation: Relax and Reflect on your previous days goals. Therapeutic interventions reviewed and discussed: Personal time to reflect without distractions Impression of participation: Participated

## 2018-09-03 LAB
GLUCOSE BLD STRIP.AUTO-MCNC: 151 MG/DL (ref 65–100)
GLUCOSE BLD STRIP.AUTO-MCNC: 168 MG/DL (ref 65–100)
GLUCOSE BLD STRIP.AUTO-MCNC: 232 MG/DL (ref 65–100)
SERVICE CMNT-IMP: ABNORMAL

## 2018-09-03 PROCEDURE — 74011636637 HC RX REV CODE- 636/637: Performed by: INTERNAL MEDICINE

## 2018-09-03 PROCEDURE — 65220000003 HC RM SEMIPRIVATE PSYCH

## 2018-09-03 PROCEDURE — 74011250637 HC RX REV CODE- 250/637

## 2018-09-03 PROCEDURE — 82962 GLUCOSE BLOOD TEST: CPT

## 2018-09-03 PROCEDURE — 74011250637 HC RX REV CODE- 250/637: Performed by: PSYCHIATRY & NEUROLOGY

## 2018-09-03 RX ADMIN — DILTIAZEM HYDROCHLORIDE 360 MG: 180 CAPSULE, COATED, EXTENDED RELEASE ORAL at 08:20

## 2018-09-03 RX ADMIN — LOSARTAN POTASSIUM 100 MG: 50 TABLET ORAL at 08:19

## 2018-09-03 RX ADMIN — CITALOPRAM HYDROBROMIDE 40 MG: 20 TABLET ORAL at 08:19

## 2018-09-03 RX ADMIN — METFORMIN HYDROCHLORIDE 1000 MG: 500 TABLET ORAL at 08:18

## 2018-09-03 RX ADMIN — GABAPENTIN 600 MG: 300 CAPSULE ORAL at 08:19

## 2018-09-03 RX ADMIN — HYDROCHLOROTHIAZIDE 25 MG: 25 TABLET ORAL at 08:18

## 2018-09-03 RX ADMIN — INSULIN LISPRO 6 UNITS: 100 INJECTION, SOLUTION INTRAVENOUS; SUBCUTANEOUS at 17:52

## 2018-09-03 RX ADMIN — TRAZODONE HYDROCHLORIDE 50 MG: 50 TABLET ORAL at 21:38

## 2018-09-03 RX ADMIN — RIVAROXABAN 20 MG: 20 TABLET, FILM COATED ORAL at 17:54

## 2018-09-03 RX ADMIN — METFORMIN HYDROCHLORIDE 500 MG: 500 TABLET ORAL at 21:38

## 2018-09-03 RX ADMIN — GABAPENTIN 600 MG: 300 CAPSULE ORAL at 17:55

## 2018-09-03 NOTE — PROGRESS NOTES
Problem: Depressed Mood (Adult/Pediatric) Goal: *STG: Participates in treatment plan Outcome: Progressing Towards Goal 
Engaged in milieu 
demonstrates appropriate behavior on unit Follows nursing direction Goal: *STG: Verbalizes anger, guilt, and other feelings in a constructive manor Outcome: Progressing Towards Goal 
verballizing and describing frustration, grief, sadness, anxiety,  
Goal: *STG: Attends activities and groups Outcome: Progressing Towards Goal 
Participates Goal: *STG: Demonstrates reduction in symptoms and increase in insight into coping skills/future focused Outcome: Progressing Towards Goal 
Denies SI. hopelessness resolved, insight into benefits of therapy and group activities Goal: Interventions Outcome: Progressing Towards Goal 
Staff focus is coping skills education, discharge planning, offering support

## 2018-09-03 NOTE — PROGRESS NOTES
Problem: Falls - Risk of 
Goal: *Absence of Falls Document Jak Tinsley Fall Risk and appropriate interventions in the flowsheet. Outcome: Progressing Towards Goal 
Fall Risk Interventions: 
  
 
  
 
Medication Interventions: Teach patient to arise slowly History of Falls Interventions: Room close to nurse's station Received pt in bed resting. Pt appears to be in no distress. Respirations even and unlabored. Continuing to monitor pt with q15 min safety rounds.

## 2018-09-03 NOTE — INTERDISCIPLINARY ROUNDS
[address] 
Patient Name: Cyndy Pulido: 69 y.o.                                     Room/Bed:  725/02 Primary Diagnosis: Major depressive disorder             
Admission Status: Involuntary Commitment                                                         
Readmission within 30 days: no 
Power of  in place: no 
Patient requires a blocked bed: no          Reason for blocked bed: n/a 
   
VTE Prophylaxis: Not indicated 
   
Mobility needs/Fall risk: no                                      
Nutritional Plan: no 
Consults: no                                                                                                                             
Labs/Testing due today?: None 
   
Sleep hours:           7                                                                                 
Participation in Care/Groups:  yes Medication Compliant?: Yes PRNS (last 24 hours): XTRV                                    
Restraints (last 24 hours):  no 
   
CIWA (range last 24 hours):              
COWS (range last 24 hours):   
   
Alcohol screening (AUDIT) completed -   AUDIT Score: 0    
If applicable, date SBIRT discussed in treatment team AND documented:  
   
Tobacco - patient is a smoker: Have You Used Tobacco in the Past 30 Days: No 
Illegal Drugs use: Have You Used Any Illegal Substances Over the Past 12 Months: No 
   
24 hour chart check complete: yes  
   
Patient goal(s) for today: Call son; prepare for discharge tomorrow; discuss financial plan with son Treatment team focus/goals: Prepare for discharge tomorrow; call son Progress note: Pt reports feeling ready to discharge tomorrow LOS:  0                                     [facility] LOS: TBD 
   
Financial concerns/prescription coverage: [employer] Date of last family contact: 9/3 SW and Nurse spoke to Pt's son                                                    
 Family requesting physician contact today: Yes Discharge plan: Return home Guns in the home:  Yes                                                                                                             
Outpatient provider(s):  To be linked 
   
Participating treatment team members: Oriana Allison; JOSE F Gibbs; Dr. Lila Holguin MD; Eather Duane, RN

## 2018-09-03 NOTE — BH NOTES
PSYCHIATRIC PROGRESS NOTE Patient Name  Zuri Faith Date of Birth 1950 [facility] [id number] Medical Record Number  [id number] Age  69 y.o. PCP Patricio Fernandez MD  
Admit date:  8/29/2018 Room Number  725/02  @ [facility]  
Date of Service  9/3/2018 PSYCHOTHERAPY SESSION NOTE: 
Length of psychotherapy session: 20 minutes Main condition/diagnosis/issues treated during session today, 9/3/2018 : familial discord, financial stressors, depression I employed Cognitive Behavioral therapy techniques, Reality-Oriented psychotherapy, as well as supportive psychotherapy in regards to various ongoing psychosocial stressors, including the following: pre-admission and current problems; housing issues; occupational issues; academic issues; legal issues; medical issues; and stress of hospitalization. Interpersonal relationship issues and psychodynamic conflicts explored. Attempts made to alleviate maladaptive patterns. We, also, worked on issues of denial & effects of substance dependency/use Overall, patient is not progressing Treatment Plan Update (reviewed an updated 9/3/2018) : I will modify psychotherapy tx plan by implementing more stress management strategies, building upon cognitive behavioral techniques, increasing coping skills, as well as shoring up psychological defenses). An extended energy and skill set was needed to engage pt in psychotherapy due to some of the following: resistiveness, complexity, negativity, confrontational nature, hostile behaviors, and/or severe abnormalities in thought processes/psychosis resulting in the loss of expressive/receptive language communication skills. E & M PROGRESS NOTE: 
  
 
 
HISTORY  
   
CC:  \"Depression\" HISTORY OF PRESENT ILLNESS/INTERVAL HISTORY:  (reviewed/updated 9/3/2018). per initial evaluation: The patient, Zuri Faith, is a 69 y.o.   WHITE [place]  female with a past psychiatric history significant for MDD, past psychiatric hospitalization, who presents at this time with complaints of (and/or evidence of) the following emotional symptoms: depression, suicidal thoughts/threats and anger ,grief and insomnia. .  Additional symptomatology include death of her  one month ago, son seizing all of the money  In her husbands accounts and inability to pay all of her bills. She was angry with her son and decided to hold a gun to her head in front of her son. She says that she feels that her life is worthless, since she cannot pay all of her bills. Taking celexa rx'd by her pcp. .  The above symptoms have been present for several weeks to years. These symptoms are of high severity. These symptoms are constant in nature. The patient's condition has been precipitated by grief, conflict with son, and psychosocial stressors Palakcharis Easton Allison presents/reports/evidences the following emotional symptoms today, 9/3/2018:depression and suicidal thoughts/threats. The above symptoms have been present for several days to weeks. These symptoms are of  Moderate, improving. . The symptoms are constant in nature. Additional symptomatology and features include low mood, irritability, anxiety. Sleep and appetite are improved. Deneis Suicidal thoughts. 09/2/18: Seen today, she remain anxious,she is complaint with med except Insulin,sleep and appetite is good. Received no prn. Mood She reported \"Santamaria good\". Denies suicidal thoughts,plan or intent. No psychotic or manic symptoms 9/3/18: Seen today in the treatment team,mildly anxious, but mood reported \"Good\",compliant with mediations, sleep and appetite si good. She denies si/hi. No psychotic or manic symptoms. SIDE EFFECTS: (reviewed/updated 9/3/2018) None reported or admitted to. ALLERGIES:(reviewed/updated 9/3/2018) Allergies Allergen Reactions  Robaxin [Methocarbamol] Anaphylaxis  Morphine Itching MEDICATIONS PRIOR TO ADMISSION:(reviewed/updated 9/3/2018) Prescriptions Prior to Admission Medication Sig  
 citalopram (CELEXA) 20 mg tablet Take 20 mg by mouth daily. Indications: major depressive disorder  dilTIAZem (TIAZAC) 360 mg ER capsule Take 360 mg by mouth daily.  furosemide (LASIX) 20 mg tablet Take 20 mg by mouth daily as needed.  rivaroxaban (XARELTO) 20 mg tab tablet Take 20 mg by mouth daily.  traZODone (DESYREL) 50 mg tablet Take 50 mg by mouth nightly. Indications: insomnia associated with depression  valsartan-hydroCHLOROthiazide (DIOVAN-HCT) 160-25 mg per tablet Take 1 Tab by mouth daily. Indications: hypertension  metFORMIN (GLUCOPHAGE) 500 mg tablet Take 1000mg every morning and 500mg every evening  Indications: type 2 diabetes mellitus  gabapentin (NEURONTIN) 300 mg capsule Take 300 mg by mouth two (2) times a day. PAST MEDICAL HISTORY: Past medical history from the initial psychiatric evaluation has been reviewed (reviewed/updated 9/3/2018) with no additional updates (I asked patient and no additional past medical history provided). No past medical history on file. No past surgical history on file. SOCIAL HISTORY: Social history from the initial psychiatric evaluation has been reviewed (reviewed/updated 9/3/2018) with no additional updates (I asked patient and no additional social history provided). Social History Social History  Marital status:  Spouse name: N/A  
 Number of children: N/A  
 Years of education: N/A Occupational History  Not on file. Social History Main Topics  Smoking status: Not on file  Smokeless tobacco: Not on file  Alcohol use Not on file  Drug use: Not on file  Sexual activity: Not on file Other Topics Concern  Not on file Social History Narrative FAMILY HISTORY: Family history from the initial psychiatric evaluation has been reviewed (reviewed/updated 9/3/2018) with no additional updates (I asked patient and no additional family history provided). No family history on file. REVIEW OF SYSTEMS: (reviewed/updated 9/3/2018) Appetite:good Sleep: good All other Review of Systems: depressed mood and anxiety HEENT: headache, neck stiffness Resp: (-)sob [place]: (-)cp [place] MENTAL STATUS EXAM (MSE): MSE FINDINGS ARE WITHIN NORMAL LIMITS (WNL) UNLESS OTHERWISE STATED BELOW. ( ALL OF THE BELOW CATEGORIES OF THE MSE HAVE BEEN REVIEWED (reviewed 9/3/2018) AND UPDATED AS DEEMED APPROPRIATE ) General Presentation age appropriate and casually dressed, cooperative Orientation oriented to time, place and person Vital Signs  See below (reviewed 9/3/2018); Vital Signs (BP, Pulse, & Temp) are within normal limits if not listed below. Gait and Station Stable/steady, no ataxia Musculoskeletal System No extrapyramidal symptoms (EPS); no abnormal muscular movements or Tardive Dyskinesia (TD); muscle strength and tone are within normal limits Language No aphasia or dysarthria Speech:  normal pitch and normal volume Thought Processes logical; normal rate of thoughts; good abstract reasoning/computation Thought Associations goal directed Thought Content not internally preoccupied Suicidal Ideations none Homicidal Ideations none Mood:  euthymic Affect:  mood-congruent Memory recent  fair Memory remote:  fair Concentration/Attention:  hypervigilance Fund of Knowledge wnl Insight:  fair Reliability fair Judgment:  fair VITALS:    
Patient Vitals for the past 24 hrs: 
 Temp Pulse Resp BP SpO2  
09/03/18 0754 98.2 °F (36.8 °C) 87 18 154/81 95 % 09/02/18 1959 98.1 °F (36.7 °C) 80 16 135/79 -  
09/02/18 1526 97.9 °F (36.6 °C) 80 16 119/69 96 % Wt Readings from Last 3 Encounters:  
09/02/18 73.3 kg (161 lb 11.2 oz) Temp Readings from Last 3 Encounters:  
09/03/18 98.2 °F (36.8 °C) BP Readings from Last 3 Encounters:  
09/03/18 154/81 Pulse Readings from Last 3 Encounters:  
09/03/18 87 DATA LABORATORY DATA:(reviewed/updated 9/3/2018) Recent Results (from the past 24 hour(s)) GLUCOSE, POC Collection Time: 09/02/18  4:33 PM  
Result Value Ref Range Glucose (POC) 95 65 - 100 mg/dL Performed by Leland Calderon GLUCOSE, POC Collection Time: 09/03/18  7:35 AM  
Result Value Ref Range Glucose (POC) 151 (H) 65 - 100 mg/dL Performed by Niranjan Henry No results found for: VALF2, VALAC, VALP, VALPR, DS6, CRBAM, CRBAMP, CARB2, XCRBAM 
No results found for: LITHM  
RADIOLOGY REPORTS:(reviewed/updated 9/3/2018) No results found. MEDICATIONS ALL MEDICATIONS:  
Current Facility-Administered Medications Medication Dose Route Frequency  insulin lispro (HUMALOG) injection   SubCUTAneous BID WITH MEALS  
 losartan (COZAAR) tablet 100 mg  100 mg Oral DAILY And  
 hydroCHLOROthiazide (HYDRODIURIL) tablet 25 mg  25 mg Oral DAILY  LORazepam (ATIVAN) tablet 0.5 mg  0.5 mg Oral Q6H PRN  
 metFORMIN (GLUCOPHAGE) tablet 1,000 mg  1,000 mg Oral DAILY WITH BREAKFAST  gabapentin (NEURONTIN) capsule 600 mg  600 mg Oral BID  citalopram (CELEXA) tablet 40 mg  40 mg Oral DAILY  ziprasidone (GEODON) 20 mg in sterile water (preservative free) 1 mL injection  20 mg IntraMUSCular BID PRN  
 OLANZapine (ZyPREXA) tablet 5 mg  5 mg Oral Q6H PRN  
 benztropine (COGENTIN) tablet 2 mg  2 mg Oral BID PRN  
 benztropine (COGENTIN) injection 2 mg  2 mg IntraMUSCular BID PRN  
 LORazepam (ATIVAN) injection 2 mg  2 mg IntraMUSCular Q4H PRN  
 zolpidem (AMBIEN) tablet 5 mg  5 mg Oral QHS PRN  
  acetaminophen (TYLENOL) tablet 650 mg  650 mg Oral Q4H PRN  
 magnesium hydroxide (MILK OF MAGNESIA) 400 mg/5 mL oral suspension 30 mL  30 mL Oral DAILY PRN  
 nicotine (NICODERM CQ) 21 mg/24 hr patch 1 Patch  1 Patch TransDERmal DAILY PRN  
 traZODone (DESYREL) tablet 50 mg  50 mg Oral QHS  metFORMIN (GLUCOPHAGE) tablet 500 mg  500 mg Oral QHS  glucose chewable tablet 16 g  4 Tab Oral PRN  
 dextrose (D50W) injection syrg 12.5-25 g  25-50 mL IntraVENous PRN  
 glucagon (GLUCAGEN) injection 1 mg  1 mg IntraMUSCular PRN  
 rivaroxaban (XARELTO) tablet 20 mg  20 mg Oral DAILY WITH DINNER  
 dilTIAZem CD (CARDIZEM CD) capsule 360 mg  360 mg Oral DAILY  loperamide (IMODIUM) capsule 4 mg  4 mg Oral Q6H PRN  
  
SCHEDULED MEDICATIONS:  
Current Facility-Administered Medications Medication Dose Route Frequency  insulin lispro (HUMALOG) injection   SubCUTAneous BID WITH MEALS  
 losartan (COZAAR) tablet 100 mg  100 mg Oral DAILY And  
 hydroCHLOROthiazide (HYDRODIURIL) tablet 25 mg  25 mg Oral DAILY  metFORMIN (GLUCOPHAGE) tablet 1,000 mg  1,000 mg Oral DAILY WITH BREAKFAST  gabapentin (NEURONTIN) capsule 600 mg  600 mg Oral BID  citalopram (CELEXA) tablet 40 mg  40 mg Oral DAILY  traZODone (DESYREL) tablet 50 mg  50 mg Oral QHS  metFORMIN (GLUCOPHAGE) tablet 500 mg  500 mg Oral QHS  rivaroxaban (XARELTO) tablet 20 mg  20 mg Oral DAILY WITH DINNER  
 dilTIAZem CD (CARDIZEM CD) capsule 360 mg  360 mg Oral DAILY ASSESSMENT & PLAN  
 
DIAGNOSES REQUIRING ACTIVE TREATMENT AND MONITORING: (reviewed/updated 9/3/2018) Patient Active Hospital Problem List: 
 Major depressive disorder (8/29/2018) Assessment: moderate to severe Plan: Agree with inpatient hospitalization for further stabilization, safety monitoring and medication management Medications- continue increased dose of Celexa to 40mg daily Provided supportive psychotherapy 09/1/18: Continue current treatment. 9/2/18: Continue current treatment. 9/3/18: Continue current treatment,proived supportive therapy Type 2 diabetes mellitus ([facility] [address] (8/29/2018) Assessment: chronic Plan: continue to monitor glc Continue current regimen I will continue to monitor blood levels (Depakote, Tegretol, lithium, clozapine---a drug with a narrow therapeutic index= NTI) and associated labs for drug therapy implemented that require intense monitoring for toxicity as deemed appropriate based on current medication side effects and pharmacodynamically determined drug 1/2 lives. In summary, Anyi Levy, is a 69 y.o.  female who presents with a severe exacerbation of the principal diagnosis of Major depressive disorder Patient's condition is mproving. Patient requires continued inpatient hospitalization for further stabilization, safety monitoring and medication management. I will continue to coordinate the provision of individual, milieu, occupational, group, and substance abuse therapies to address target symptoms/diagnoses as deemed appropriate for the individual patient. A coordinated, multidisplinary treatment team round was conducted with the patient (this team consists of the nurse, psychiatric unit pharmcist,  and writer). Complete current electronic health record for patient has been reviewed today including consultant notes, ancillary staff notes, nurses and psychiatric tech notes. Suicide risk assessment completed and patient deemed to be of low risk for suicide at this time. The following regarding medications was addressed during rounds with patient:  
the risks and benefits of the proposed medication. The patient was given the opportunity to ask questions. Informed consent given to the use of the above medications.  Will continue to adjust psychiatric and non-psychiatric medications (see above \"medication\" section and orders section for details) as deemed appropriate & based upon diagnoses and response to treatment. I will continue to order blood tests/labs and diagnostic tests as deemed appropriate and review results as they become available (see orders for details and above listed lab/test results). I will order psychiatric records from previous [facility] hospitals to further elucidate the nature of patient's psychopathology and review once available. I will gather additional collateral information from friends, family and o/p treatment team to further elucidate the nature of patient's psychopathology and baselline level of psychiatric functioning. I certify that this patient's inpatient psychiatric hospital services furnished since the previous certification were, and continue to be, required for treatment that could reasonably be expected to improve the patient's condition, or for diagnostic study, and that the patient continues to need, on a daily basis, active treatment furnished directly by or requiring the supervision of inpatient psychiatric facility personnel. In addition, the hospital records show that services furnished were intensive treatment services, admission or related services, or equivalent services. EXPECTED DISCHARGE DATE/DAY: TBD  
 
DISPOSITION: Home Signed By:  
Jessi Guerra MD 
9/3/2018

## 2018-09-03 NOTE — PROGRESS NOTES
Problem: Depressed Mood (Adult/Pediatric) Goal: *STG: Verbalizes anger, guilt, and other feelings in a constructive manor Outcome: Progressing Towards Goal 
Pt is anxious this evening. She is more irritable. She was using the phone and appeared upset when she got off the phone. Blood sugar = 232. Pt sits with female peers. Staff will continue q15 checks and encourage pt to share feelings.

## 2018-09-03 NOTE — BH NOTES
GROUP THERAPY PROGRESS NOTE Beatris Allison participated in the General Unit's Process Group, with a focus on setting a direction or goal, identifying feelings and planning for the day. Group time: 80 minutes. Personal goal for participation: To increase the capacity to improve ones mood and structure. Goal orientation: The patient will be able to identify a direction or goal for themselves, identify their feelings, develop a plan for structuring their day, and continue working on a discharge planning. Group therapy participation: With prompting, this patient participated in the group. Therapeutic interventions reviewed and discussed: The group members were asked to introduce themselves to each other and to see if they could identify an emotion they are having and/or let the group know what they want to focus on for the day as they continue to make discharge plans. They were also asked to read a list of twenty-five suggestions for living and to pick one for themselves today. Impression of participation: The patient sat and listened to her peers before speaking. She said she feeling \"alright. [facility] I'm waiting to see the doctor [attending psychiatrist], getting as shower, and having a meeting with my son and a nurse. \" She also expressed worries about her financial situation and she was encouraged to write out her current debt in preparation for her meeting this afternoon with her son. 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 She added, and cried, when she talked about how much kinder he became to her once he realized he was in the process of dying. \"A lot of the time he was gruff with me, but as his health got worse he gave me compliments and thanked me for things like bringing him his food. \" The patient was alert, generally oriented, and engaged. She expressed no current SI/HI and displayed no overt psychotic symptoms.  Her affect was sad but not as depressed or as hopeless as she felt on admission. Her mood reflected her affect. She is beginning to consider plans for her aftercare needs.

## 2018-09-04 VITALS
HEIGHT: 64 IN | TEMPERATURE: 98.2 F | HEART RATE: 73 BPM | BODY MASS INDEX: 27.61 KG/M2 | WEIGHT: 161.7 LBS | RESPIRATION RATE: 18 BRPM | DIASTOLIC BLOOD PRESSURE: 73 MMHG | SYSTOLIC BLOOD PRESSURE: 133 MMHG | OXYGEN SATURATION: 97 %

## 2018-09-04 LAB
GLUCOSE BLD STRIP.AUTO-MCNC: 128 MG/DL (ref 65–100)
GLUCOSE BLD STRIP.AUTO-MCNC: 142 MG/DL (ref 65–100)
SERVICE CMNT-IMP: ABNORMAL
SERVICE CMNT-IMP: ABNORMAL

## 2018-09-04 PROCEDURE — 82962 GLUCOSE BLOOD TEST: CPT

## 2018-09-04 PROCEDURE — 74011250637 HC RX REV CODE- 250/637

## 2018-09-04 PROCEDURE — 74011250637 HC RX REV CODE- 250/637: Performed by: PSYCHIATRY & NEUROLOGY

## 2018-09-04 RX ORDER — GABAPENTIN 300 MG/1
600 CAPSULE ORAL 2 TIMES DAILY
Qty: 30 CAP | Refills: 1 | Status: SHIPPED | OUTPATIENT
Start: 2018-09-04

## 2018-09-04 RX ORDER — CITALOPRAM 40 MG/1
40 TABLET, FILM COATED ORAL DAILY
Qty: 15 TAB | Refills: 1 | Status: SHIPPED | OUTPATIENT
Start: 2018-09-05

## 2018-09-04 RX ADMIN — LOSARTAN POTASSIUM 100 MG: 50 TABLET ORAL at 10:44

## 2018-09-04 RX ADMIN — RIVAROXABAN 20 MG: 20 TABLET, FILM COATED ORAL at 16:45

## 2018-09-04 RX ADMIN — DILTIAZEM HYDROCHLORIDE 360 MG: 180 CAPSULE, COATED, EXTENDED RELEASE ORAL at 10:44

## 2018-09-04 RX ADMIN — CITALOPRAM HYDROBROMIDE 40 MG: 20 TABLET ORAL at 10:44

## 2018-09-04 RX ADMIN — HYDROCHLOROTHIAZIDE 25 MG: 25 TABLET ORAL at 10:44

## 2018-09-04 RX ADMIN — GABAPENTIN 600 MG: 300 CAPSULE ORAL at 17:00

## 2018-09-04 RX ADMIN — METFORMIN HYDROCHLORIDE 1000 MG: 500 TABLET ORAL at 10:47

## 2018-09-04 RX ADMIN — GABAPENTIN 600 MG: 300 CAPSULE ORAL at 10:44

## 2018-09-04 NOTE — BH NOTES
Patient reviewed and signed discharge instructions, was discharged with instructions, prescriptions and all belongings and walked down to discharge area to meet taxi.

## 2018-09-04 NOTE — PROGRESS NOTES
Pharmacist Discharge Medication Reconciliation Discharging Provider: Dr. Tono Calixto Significant PMH: No past medical history on file. Chief Complaint for this Admission: No chief complaint on file. Allergies: Robaxin [methocarbamol] and Morphine Discharge Medications:  
Current Discharge Medication List  
  
 
CONTINUE these medications which have CHANGED Details  
citalopram (CELEXA) 40 mg tablet Take 1 Tab by mouth daily. Indications: Generalized Anxiety Disorder, major depressive disorder 
Qty: 15 Tab, Refills: 1  
  
gabapentin (NEURONTIN) 300 mg capsule Take 2 Caps by mouth two (2) times a day. Indications: NEUROPATHIC PAIN Qty: 30 Cap, Refills: 1 CONTINUE these medications which have NOT CHANGED Details  
dilTIAZem (TIAZAC) 360 mg ER capsule Take 360 mg by mouth daily. rivaroxaban (XARELTO) 20 mg tab tablet Take 20 mg by mouth daily. traZODone (DESYREL) 50 mg tablet Take 50 mg by mouth nightly. Indications: insomnia associated with depression  
  
valsartan-hydroCHLOROthiazide (DIOVAN-HCT) 160-25 mg per tablet Take 1 Tab by mouth daily. Indications: hypertension  
  
metFORMIN (GLUCOPHAGE) 500 mg tablet Take 1000mg every morning and 500mg every evening  Indications: type 2 diabetes mellitus STOP taking these medications  
  
 furosemide (LASIX) 20 mg tablet Comments:  
Reason for Stopping:   
   
  
 
Changed gabapentin qty to #60 for 15 day supply.  
 
The patient's chart, MAR and AVS were reviewed by LESLIE YuanD.

## 2018-09-04 NOTE — PROGRESS NOTES
Problem: Depressed Mood (Adult/Pediatric) Goal: *STG: Participates in treatment plan Outcome: Progressing Towards Goal 
Out on unit social w peers and staff. Reports feeling hopeful and requesting d/c home today. Denies SI, no self harming behaviors demonstrates appropriate behaviors and ability to care for self and get her needs met. Staff focus is on d/c planning and follow up care.

## 2018-09-04 NOTE — BH NOTES
Behavioral Health Transition Record to Provider Patient Name: Sina Cooper YOB: 1950 Medical Record Number: [id number] Date of Admission: 8/29/2018 Date of Discharge: 9/4/2018 Attending Provider: Odalys Cruz MD 
Discharging Provider: Odalys Cruz MD 
To contact this individual call [phone] and ask the  to page. If unavailable, ask to be transferred to [facility] Provider on call. [facility] Provider will be available on call 24/7 and during holidays. Primary Care Provider: Sage Vines MD 
 
Allergies Allergen Reactions  Robaxin [Methocarbamol] Anaphylaxis  Morphine Itching Reason for Admission: Pt admitted on a TDO for sedation and threatening to kill herself. Admission Diagnosis: Major Depression Major depressive disorder * No surgery found * Results for orders placed or performed during the hospital encounter of 08/29/18 URINALYSIS W/ REFLEX CULTURE Result Value Ref Range Color YELLOW/STRAW Appearance CLEAR CLEAR Specific gravity 1.006 1.003 - 1.030    
 pH (UA) 6.5 5.0 - 8.0 Protein NEGATIVE  NEG mg/dL Glucose NEGATIVE  NEG mg/dL Ketone NEGATIVE  NEG mg/dL Bilirubin NEGATIVE  NEG Blood NEGATIVE  NEG Urobilinogen 0.2 0.2 - 1.0 EU/dL Nitrites NEGATIVE  NEG Leukocyte Esterase MODERATE (A) NEG    
 WBC 0-4 0 - 4 /hpf  
 RBC 0-5 0 - 5 /hpf Epithelial cells FEW FEW /lpf Bacteria NEGATIVE  NEG /hpf  
 UA:UC IF INDICATED CULTURE NOT INDICATED BY UA RESULT CNI HEMOGLOBIN A1C WITH EAG Result Value Ref Range Hemoglobin A1c 7.2 (H) 4.2 - 6.3 % Est. average glucose 160 mg/dL TSH 3RD GENERATION Result Value Ref Range TSH 6.23 (H) 0.36 - 3.74 uIU/mL PROTHROMBIN TIME + INR Result Value Ref Range INR 1.0 0.9 - 1.1 Prothrombin time 10.2 9.0 - 11.1 sec PTT Result Value Ref Range aPTT 36.4 (H) 22.1 - 32.0 sec aPTT, therapeutic range     58.0 - 77.0 SECS  
CBC WITH AUTOMATED DIFF Result Value Ref Range WBC 8.6 3.6 - 11.0 K/uL  
 RBC 4.28 3.80 - 5.20 M/uL  
 HGB 13.3 11.5 - 16.0 g/dL HCT 40.1 35.0 - 47.0 % MCV 93.7 80.0 - 99.0 FL  
 MCH 31.1 26.0 - 34.0 PG  
 MCHC 33.2 30.0 - 36.5 g/dL  
 RDW 13.2 11.5 - 14.5 % PLATELET 613 236 - 677 K/uL MPV 10.9 8.9 - 12.9 FL  
 NRBC 0.0 0  WBC ABSOLUTE NRBC 0.00 0.00 - 0.01 K/uL NEUTROPHILS 64 32 - 75 % LYMPHOCYTES 28 12 - 49 % MONOCYTES 7 5 - 13 % EOSINOPHILS 1 0 - 7 % BASOPHILS 0 0 - 1 % IMMATURE GRANULOCYTES 0 0.0 - 0.5 % ABS. NEUTROPHILS 5.5 1.8 - 8.0 K/UL  
 ABS. LYMPHOCYTES 2.4 0.8 - 3.5 K/UL  
 ABS. MONOCYTES 0.6 0.0 - 1.0 K/UL  
 ABS. EOSINOPHILS 0.1 0.0 - 0.4 K/UL  
 ABS. BASOPHILS 0.0 0.0 - 0.1 K/UL  
 ABS. IMM. GRANS. 0.0 0.00 - 0.04 K/UL  
 DF AUTOMATED T4 (THYROXINE) Result Value Ref Range T4, Total 11.1 4.8 - 13.9 ug/dL CBC W/O DIFF Result Value Ref Range WBC 9.5 3.6 - 11.0 K/uL  
 RBC 4.49 3.80 - 5.20 M/uL  
 HGB 14.1 11.5 - 16.0 g/dL HCT 42.8 35.0 - 47.0 % MCV 95.3 80.0 - 99.0 FL  
 MCH 31.4 26.0 - 34.0 PG  
 MCHC 32.9 30.0 - 36.5 g/dL  
 RDW 13.0 11.5 - 14.5 % PLATELET 459 888 - 169 K/uL MPV 10.7 8.9 - 12.9 FL  
 NRBC 0.0 0  WBC ABSOLUTE NRBC 0.00 0.00 - 0.01 K/uL METABOLIC PANEL, BASIC Result Value Ref Range Sodium 137 136 - 145 mmol/L Potassium 3.6 3.5 - 5.1 mmol/L Chloride 100 97 - 108 mmol/L  
 CO2 27 21 - 32 mmol/L Anion gap 10 5 - 15 mmol/L Glucose 215 (H) 65 - 100 mg/dL BUN 17 6 - 20 MG/DL Creatinine 1.10 (H) 0.55 - 1.02 MG/DL  
 BUN/Creatinine ratio 15 12 - 20 GFR est AA 60 (L) >60 ml/min/1.73m2 GFR est non-AA 49 (L) >60 ml/min/1.73m2 Calcium 9.0 8.5 - 10.1 MG/DL  
GLUCOSE, POC Result Value Ref Range Glucose (POC) 153 (H) 65 - 100 mg/dL Performed by Zaira Howell GLUCOSE, POC Result Value Ref Range Glucose (POC) 118 (H) 65 - 100 mg/dL Performed by [employer] GLUCOSE, POC Result Value Ref Range Glucose (POC) 166 (H) 65 - 100 mg/dL Performed by Trent Smiley GLUCOSE, POC Result Value Ref Range Glucose (POC) 135 (H) 65 - 100 mg/dL Performed by Lucas Mckeon GLUCOSE, POC Result Value Ref Range Glucose (POC) 153 (H) 65 - 100 mg/dL Performed by Krista Henriquez GLUCOSE, POC Result Value Ref Range Glucose (POC) 95 65 - 100 mg/dL Performed by Viji Ferrell   
GLUCOSE, POC Result Value Ref Range Glucose (POC) 128 (H) 65 - 100 mg/dL Performed by Delroy Gaviria GLUCOSE, POC Result Value Ref Range Glucose (POC) 117 (H) 65 - 100 mg/dL Performed by Maggi JOYA (Moon)   
GLUCOSE, POC Result Value Ref Range Glucose (POC) 140 (H) 65 - 100 mg/dL Performed by Sonam Church GLUCOSE, POC Result Value Ref Range Glucose (POC) 95 65 - 100 mg/dL Performed by Sonam Church GLUCOSE, POC Result Value Ref Range Glucose (POC) 151 (H) 65 - 100 mg/dL Performed by Krista Henriquez GLUCOSE, POC Result Value Ref Range Glucose (POC) 232 (H) 65 - 100 mg/dL Performed by Maggi Phelps) MAHNAZ   
GLUCOSE, POC Result Value Ref Range Glucose (POC) 168 (H) 65 - 100 mg/dL Performed by Maggi Phelps) MAHNAZ   
GLUCOSE, POC Result Value Ref Range Glucose (POC) 142 (H) 65 - 100 mg/dL Performed by Krista Henriquez GLUCOSE, POC Result Value Ref Range Glucose (POC) 128 (H) 65 - 100 mg/dL Performed by Domonique Carrillo ECG RHYTHM ANALYSIS ADULT Result Value Ref Range Ventricular Rate 66 BPM  
 Atrial Rate 66 BPM  
 P-R Interval 196 ms QRS Duration 86 ms  
 Q-T Interval 430 ms QTC Calculation (Bezet) 450 ms Calculated P Axis 65 degrees Calculated R Axis -59 degrees Calculated T Axis 72 degrees Diagnosis Normal sinus rhythm Left axis deviation Inferior infarct , age undetermined Anterolateral infarct , age undetermined Abnormal ECG No previous ECGs available Confirmed by Vera Hernández MD, Mago Jackie ([id number]) on 2018 10:32:37 PM 
  
 
 
Immunizations administered during this encounter: There is no immunization history on file for this patient. Screening for Metabolic Disorders for Patients on Antipsychotic Medications 
(Data obtained from the EMR) Estimated Body Mass Index Estimated body mass index is 27.76 kg/(m^2) as calculated from the following: 
  Height as of this encounter: 5' 4\" (1.626 m). Weight as of this encounter: 73.3 kg (161 lb 11.2 oz). Vital Signs/Blood Pressure Visit Vitals  /73  Pulse 73  Temp 98.2 °F (36.8 °C)  Resp 18  Ht 5' 4\" (1.626 m)  Wt 73.3 kg (161 lb 11.2 oz)  SpO2 97%  Breastfeeding No  
 BMI 27.76 kg/m2 Blood Glucose/Hemoglobin A1c Lab Results Component Value Date/Time Glucose 215 (H) 2018 09:18 AM  
 Glucose (POC) 128 (H) 2018 04:08 PM  
 
Lab Results Component Value Date/Time Hemoglobin A1c 7.2 (H) 2018 06:45 AM  
 
  
Lipid Panel No results found for: CHOL, CHOLX, CHLST, [address], [id number], HDL, LDL, LDLC, DLDLP, TGLX, TRIGL, TRIGP, CHHD, CHHDX Discharge Diagnosis: Major depressive disorder (ICD-10-CM: F32.9) Discharge Plan: Patient discharged home via taxi. DISCHARGE SUMMARYNAME:Isabel Allison : 1950 MRN: [id number] The patient Sarah Beal exhibits the ability to control behavior in a less restrictive environment. Patient's level of functioning is improving. No assaultive/destructive behavior has been observed for the past 24 hours. No suicidal/homicidal threat or behavior has been observed for the past 24 hours. There is no evidence of serious medication side effects. Patient has not been in physical or protective restraints for at least the past 24 hours. If weapons involved, how are they secured?   Patient's son has confirmed that all weapons have been removed from the home. Is patient aware of and in agreement with discharge plan? Yes Arrangements for medication:  Prescriptions given to patient. Referral for substance abuse treatment? Patient is not using substances/Not applicable. Referral for smoking cessation needed? Patient is not a smoker/Not applicable. Copy of discharge instructions to provider?:  Dr. Nathaly Beard Arrangements for transportation home:  Taxi Keep all follow up appointments as scheduled, continue to take prescribed medications per physician instructions. Mental health crisis number:  347 or your local mental health crisis line number at [phone] or [phone]. Discharge Medication List and Instructions:  
Discharge Medication List as of 9/4/2018  4:38 PM  
  
CONTINUE these medications which have CHANGED Details  
citalopram (CELEXA) 40 mg tablet Take 1 Tab by mouth daily. Indications: Generalized Anxiety Disorder, major depressive disorder, Print, Disp-15 Tab, R-1  
  
gabapentin (NEURONTIN) 300 mg capsule Take 2 Caps by mouth two (2) times a day. Indications: NEUROPATHIC PAIN, Print, Disp-30 Cap, R-1  
  
  
CONTINUE these medications which have NOT CHANGED Details  
dilTIAZem (TIAZAC) 360 mg ER capsule Take 360 mg by mouth daily. , Historical Med  
  
rivaroxaban (XARELTO) 20 mg tab tablet Take 20 mg by mouth daily. , Historical Med  
  
traZODone (DESYREL) 50 mg tablet Take 50 mg by mouth nightly. Indications: insomnia associated with depression, Historical Med  
  
valsartan-hydroCHLOROthiazide (DIOVAN-HCT) 160-25 mg per tablet Take 1 Tab by mouth daily. Indications: hypertension, Historical Med  
  
metFORMIN (GLUCOPHAGE) 500 mg tablet Take 1000mg every morning and 500mg every evening  Indications: type 2 diabetes mellitus, Historical Med  
  
  
STOP taking these medications  
  
 furosemide (LASIX) 20 mg tablet Comments:  
Reason for Stopping: Unresulted Labs (24h ago through future) Start     Ordered 08/05/64 8383  METABOLIC PANEL, BASIC  TOMORROW AM,   R    
 09/03/18 9788 To obtain results of studies pending at discharge, please contact [phone] Follow-up Information Follow up With Details Comments Contact Info Nicki Alejandra MD On 9/14/2018 You have an 11:00am hospital discharge appointment with your primary care provider. Please arrive 15 minutes early. [address] [address] 
[phone] Jessica Munoz On 9/6/2018 You have a 9:30am appointment for individual therapy. Plan to be in this appointment until 11:00am. [facility] 
[address] 
[phone] Advanced Directive:  
Does the patient have an appointed surrogate decision maker? No 
Does the patient have a Medical Advance Directive? No 
Does the patient have a Psychiatric Advance Directive? No 
If the patient does not have a surrogate or Medical Advance Directive AND Psychiatric Advance Directive, the patient was offered information on these advance directives Yes and Patient declined to complete Patient Instructions: Please continue all medications until otherwise directed by physician. Tobacco Cessation Discharge Plan:  
Is the patient a smoker and needs referral for smoking cessation? No 
Patient referred to the following for smoking cessation with an appointment? Refused Patient was offered medication to assist with smoking cessation at discharge? Refused Was education for smoking cessation added to the discharge instructions? Yes Alcohol/Substance Abuse Discharge Plan:  
Does the patient have a history of substance/alcohol abuse and requires a referral for treatment? No 
Patient referred to the following for substance/alcohol abuse treatment with an appointment? Refused Patient was offered medication to assist with alcohol cessation at discharge? Refused Was education for substance/alcohol abuse added to discharge instructions? Yes Patient discharged to Home; discussed with patient/caregiver and provided to the patient/caregiver either in hard copy or electronically.

## 2018-09-04 NOTE — BH NOTES
GROUP THERAPY PROGRESS NOTE Bill Allison participated in the General Unit's Process Group, with a focus identifying feelings and planning for the day. Group time: 60 minutes. Personal goal for participation: To increase the capacity to improve ones mood and structure. Goal orientation: The patient will be able to identify their feelings, develop a plan for structuring their day, and discharge planning. Group therapy participation: With prompting, this patient participated in the group. Therapeutic interventions reviewed and discussed: The group members were asked to introduce themselves to each other and to see if they could identify an emotion they are having and/or let the group know what they want to focus on for the day as they continue to make discharge plans. Impression of participation: The patient said is planning on going home later today admitted to feeling a little anxious about speaking with her son. She said she had put together a budget to share with him and it was recommended by a peer that she had a neutral party present when she meets with her son. She suggested her . The patient was alert, generally oriented, and cooperative. She expressed no current SI/HI and displayed no overt psychotic symptoms. Her affect is not as depressed or anxious as it was on admission. Her mood reflected her affect.

## 2018-09-04 NOTE — BH NOTES
GROUP THERAPY PROGRESS NOTE Carlos Alberto Peace is participating in Reflections. Group time: 20 minutes Personal goal for participation: unit orientation and daily progress Goal orientation: personal 
 
Group therapy participation: active Therapeutic interventions reviewed and discussed: yes Impression of participation: Seems in fair spirits. Comments and questions appropriate to group discussion.

## 2018-09-04 NOTE — DISCHARGE INSTRUCTIONS
DISCHARGE SUMMARY    NAME:Isabel Allison  : 1950  MRN: [id number]    The patient Sina Cooper exhibits the ability to control behavior in a less restrictive environment. Patient's level of functioning is improving. No assaultive/destructive behavior has been observed for the past 24 hours. No suicidal/homicidal threat or behavior has been observed for the past 24 hours. There is no evidence of serious medication side effects. Patient has not been in physical or protective restraints for at least the past 24 hours. If weapons involved, how are they secured? Patient's son has confirmed that all weapons have been removed from the home. Is patient aware of and in agreement with discharge plan? Yes    Arrangements for medication:  Prescriptions given to patient. Referral for substance abuse treatment? Patient is not using substances/Not applicable. Referral for smoking cessation needed? Patient is not a smoker/Not applicable. Copy of discharge instructions to provider?:  Dr. Ap Braden for transportation home:  Taxi    Keep all follow up appointments as scheduled, continue to take prescribed medications per physician instructions. Mental health crisis number:  [facility] or your local mental health crisis line number at [phone] or [phone]. DISCHARGE SUMMARY from Nurse    PATIENT INSTRUCTIONS:    What to do at Home:  Recommended activity: Activity as tolerated,     If you experience any of the following symptoms thoughts of harming self, feeling overwhelmed with hopelessness, please follow up with your assigned providers and local crisis number. *  Please give a list of your current medications to your Primary Care Provider. *  Please update this list whenever your medications are discontinued, doses are      changed, or new medications (including over-the-counter products) are added.     *  Please carry medication information at all times in case of emergency situations. These are general instructions for a healthy lifestyle:    No smoking/ No tobacco products/ Avoid exposure to second hand smoke  Surgeon General's Warning:  Quitting smoking now greatly reduces serious risk to your health. Obesity, smoking, and sedentary lifestyle greatly increases your risk for illness    A healthy diet, regular physical exercise & weight monitoring are important for maintaining a healthy lifestyle    You may be retaining fluid if you have a history of heart failure or if you experience any of the following symptoms:  Weight gain of 3 pounds or more overnight or 5 pounds in a week, increased swelling in our hands or feet or shortness of breath while lying flat in bed. Please call your doctor as soon as you notice any of these symptoms; do not wait until your next office visit. Recognize signs and symptoms of STROKE:    F-face looks uneven    A-arms unable to move or move unevenly    S-speech slurred or non-existent    T-time-call 911 as soon as signs and symptoms begin-DO NOT go       Back to bed or wait to see if you get better-TIME IS BRAIN. Warning Signs of HEART ATTACK     Call 911 if you have these symptoms:   Chest discomfort. Most heart attacks involve discomfort in the center of the chest that lasts more than a few minutes, or that goes away and comes back. It can feel like uncomfortable pressure, squeezing, fullness, or pain.  Discomfort in other areas of the upper body. Symptoms can include pain or discomfort in one or both arms, the back, neck, jaw, or stomach.  Shortness of breath with or without chest discomfort.  Other signs may include breaking out in a cold sweat, nausea, or lightheadedness. Don't wait more than five minutes to call 911 - MINUTES MATTER! Fast action can save your life. Calling 911 is almost always the fastest way to get lifesaving treatment.  Emergency Medical Services staff can begin treatment when they arrive -- up to an hour sooner than if someone gets to the hospital by car. The discharge information has been reviewed with the patient. The patient verbalized understanding. Discharge medications reviewed with the patient and appropriate educational materials and side effects teaching were provided.   ___________________________________________________________________________________________________________________________________

## 2018-09-04 NOTE — PROGRESS NOTES
Problem: Falls - Risk of 
Goal: *Absence of Falls Document Catherene Bunting Fall Risk and appropriate interventions in the flowsheet. Outcome: Progressing Towards Goal 
Fall Risk Interventions: 
  
Received pt in bed resting, appears to be sleep, no distress noted. Pt remains on q15min safety checks will continue to monitor 0630: pt refused to have morning labs drawn

## 2018-09-04 NOTE — INTERDISCIPLINARY ROUNDS
[address] 
Patient Name: Rory Brands: 69 y.o.                                     Room/Bed:  725/02 Primary Diagnosis: Major depressive disorder             
Admission Status: Involuntary Commitment                                                         
Readmission within 30 days: no 
Power of  in place: no 
Patient requires a blocked bed: no          Reason for blocked bed: n/a 
   
VTE Prophylaxis: Not indicated 
   
Mobility needs/Fall risk: no                                      
Nutritional Plan: no 
Consults: no                                                                                                                             
Labs/Testing due today?: None 
   
Sleep hours:      5                                                                              
Participation in Care/Groups:  yes Medication Compliant?: Yes PRNS (last 24 hours): JHUS                                    
Restraints (last 24 hours):  no 
   
CIWA (range last 24 hours):              
COWS (range last 24 hours):   
   
Alcohol screening (AUDIT) completed -   AUDIT Score: 0    
If applicable, date SBIRT discussed in treatment team AND documented:  
   
Tobacco - patient is a smoker: Have You Used Tobacco in the Past 30 Days: No 
Illegal Drugs use: Have You Used Any Illegal Substances Over the Past 12 Months: No 
   
24 hour chart check complete: yes  
   
Patient goal(s) for today: Discharge Treatment team focus/goals: Discharge Progress note: Pt ready to discharge LOS:  0                                     [id number] LOS: TBD 
   
Financial concerns/prescription coverage: [employer] Date of last family contact: 9/4 Nurse spoke to Pt's son                                                    
Family requesting physician contact today: Yes Discharge plan: Return home Guns in the home:  Yes                                                                                                             
Outpatient provider(s): To be linked 
   
Participating treatment team members: David Oneill, [place], MSW; Dr. Flash Cisneros MD; Honey Mackenzie RN; Jaskaran Keller, ChristinaD

## 2018-09-13 NOTE — DISCHARGE SUMMARY
PSYCHIATRIC DISCHARGE SUMMARY         IDENTIFICATION:    Patient Name  Nellie Holder   Date of Birth 1950   [facility] [id number]   Medical Record Number  [id number]      Age  69 y.o. PCP Shawn Alexander MD   Admit date:  8/29/2018    Discharge date: 9/4/18   Room Number  725/02  @ [facility]   Date of Service  9/4/18            TYPE OF DISCHARGE: REGULAR               CONDITION AT DISCHARGE: improved       PROVISIONAL & DISCHARGE DIAGNOSES:    Problem List  Date Reviewed: 8/29/2018          Codes Class    * (Principal)Major depressive disorder ICD-10-CM: F32.9  ICD-9-CM: 296.20         Type 2 diabetes mellitus ([facility] [address] ICD-10-CM: E11.9  ICD-9-CM: 250.00               Active Hospital Problems    *Major depressive disorder      [facility]        DISCHARGE DIAGNOSIS:   Axis I:  SEE ABOVE  Axis II: SEE ABOVE  Axis III: SEE ABOVE  Axis IV:  Grief, family conflict; lack of structure  Axis V:  45 on admission, 55 on discharge 65(baseline)       CC & HISTORY OF PRESENT ILLNESS:  The patient, Nellie Holder, is a 69 y.o. WHITE [place]  female with a past psychiatric history significant for MDD, past psychiatric hospitalization, who presents at this time with complaints of (and/or evidence of) the following emotional symptoms: depression, suicidal thoughts/threats and anger ,grief and insomnia. .  Additional symptomatology include death of her  one month ago, son seizing all of the money  In her husbands accounts and inability to pay all of her bills. She was angry with her son and decided to hold a gun to her head in front of her son. She says that she feels that her life is worthless, since she cannot pay all of her bills. Taking celexa rx'd by her pcp. .  The above symptoms have been present for several weeks to years. These symptoms are of high severity. These symptoms are constant in nature.   The patient's condition has been precipitated by grief, conflict with son, and psychosocial stressors         SOCIAL HISTORY:    Social History     Social History    Marital status:      Spouse name: N/A    Number of children: N/A    Years of education: N/A     Occupational History    Not on file. Social History Main Topics    Smoking status: Not on file    Smokeless tobacco: Not on file    Alcohol use Not on file    Drug use: Not on file    Sexual activity: Not on file     Other Topics Concern    Not on file     Social History Narrative      FAMILY HISTORY:   No family history on file. HOSPITALIZATION COURSE:    Brittany Cosby was admitted to the inpatient psychiatric unit [facility] for acute psychiatric stabilization in regards to symptomatology as described in the HPI above. The differential diagnosis at time of admission included: MDD vs. Adjustment disorder vs. Complicated bereavement. While on the unit Isabel Allison was involved in individual, group, occupational and milieu therapy. Psychiatric medications were adjusted during this hospitalization including (see below). Hetal Allison demonstrated a slow, but progressive improvement in overall condition. Much of patient's depression appeared to be related to situational stressors, death of her , conflict with her  and psychological factors. Please see individual progress notes for more specific details regarding patient's hospitalization course. At time of discharge, Hetal Allison is without significant problems of depression or SI. Patient free of suicidal and homicidal ideations (appears to be at very low risk of suicide or homicide) and reports many positive predictive factors in terms of not attempting suicide or homicide. Overall presentation at time of discharge is most consistent with the diagnosis of MDD, Grief. Patient with request for discharge today. There are no grounds to seek a TDO. Patient has maximized benefit to be derived from acute inpatient psychiatric treatment.   All members of the treatment team concur with each other in regards to plans for discharge today per patient's request.  Patient and family are aware and in agreement with discharge and discharge plan.            LABS AND IMAGAING:    Labs Reviewed   URINALYSIS W/ REFLEX CULTURE - Abnormal; Notable for the following:        Result Value    Leukocyte Esterase MODERATE (*)     All other components within normal limits   HEMOGLOBIN A1C WITH EAG - Abnormal; Notable for the following:     Hemoglobin A1c 7.2 (*)     All other components within normal limits   TSH 3RD GENERATION - Abnormal; Notable for the following:     TSH 6.23 (*)     All other components within normal limits   PTT - Abnormal; Notable for the following:     aPTT 36.4 (*)     All other components within normal limits   METABOLIC PANEL, BASIC - Abnormal; Notable for the following:     Glucose 215 (*)     Creatinine 1.10 (*)     GFR est AA 60 (*)     GFR est non-AA 49 (*)     All other components within normal limits   GLUCOSE, POC - Abnormal; Notable for the following:     Glucose (POC) 153 (*)     All other components within normal limits   GLUCOSE, POC - Abnormal; Notable for the following:     Glucose (POC) 118 (*)     All other components within normal limits   GLUCOSE, POC - Abnormal; Notable for the following:     Glucose (POC) 166 (*)     All other components within normal limits   GLUCOSE, POC - Abnormal; Notable for the following:     Glucose (POC) 135 (*)     All other components within normal limits   GLUCOSE, POC - Abnormal; Notable for the following:     Glucose (POC) 153 (*)     All other components within normal limits   GLUCOSE, POC - Abnormal; Notable for the following:     Glucose (POC) 128 (*)     All other components within normal limits   GLUCOSE, POC - Abnormal; Notable for the following:     Glucose (POC) 117 (*)     All other components within normal limits   GLUCOSE, POC - Abnormal; Notable for the following:     Glucose (POC) 140 (*)     All other components within normal limits   GLUCOSE, POC - Abnormal; Notable for the following:     Glucose (POC) 151 (*)     All other components within normal limits   GLUCOSE, POC - Abnormal; Notable for the following:     Glucose (POC) 232 (*)     All other components within normal limits   GLUCOSE, POC - Abnormal; Notable for the following:     Glucose (POC) 168 (*)     All other components within normal limits   GLUCOSE, POC - Abnormal; Notable for the following:     Glucose (POC) 142 (*)     All other components within normal limits   GLUCOSE, POC - Abnormal; Notable for the following:     Glucose (POC) 128 (*)     All other components within normal limits   PROTHROMBIN TIME + INR   CBC WITH AUTOMATED DIFF   T4 (THYROXINE)   CBC W/O DIFF   GLUCOSE, POC   GLUCOSE, POC   SAMPLE TO BLOOD BANK     No results found for: DS35, PHEN, PHENO, PHENT, DILF, DS39, PHENY, PTN, VALF2, VALAC, VALP, VALPR, DS6, CRBAM, CRBAMP, CARB2, XCRBAM  Admission on 08/29/2018, Discharged on 09/04/2018   Component Date Value Ref Range Status    Ventricular Rate 08/29/2018 66  BPM Final    Atrial Rate 08/29/2018 66  BPM Final    P-R Interval 08/29/2018 196  ms Final    QRS Duration 08/29/2018 86  ms Final    Q-T Interval 08/29/2018 430  ms Final    QTC Calculation (Bezet) 08/29/2018 450  ms Final    Calculated P Axis 08/29/2018 65  degrees Final    Calculated R Axis 08/29/2018 -59  degrees Final    Calculated T Axis 08/29/2018 72  degrees Final    Diagnosis 08/29/2018    Final                    Value:Normal sinus rhythm  Left axis deviation  Inferior infarct , age undetermined  Anterolateral infarct , age undetermined  Abnormal ECG  No previous ECGs available  Confirmed by Tina Tan MD, Zena Sauceda ([id number]) on 8/30/2018 10:32:37 PM      Color 08/29/2018 YELLOW/STRAW    Final    Appearance 08/29/2018 CLEAR  CLEAR   Final    Specific gravity 08/29/2018 1.006  1.003 - 1.030   Final    pH (UA) 08/29/2018 6.5  5.0 - 8.0   Final    Protein 08/29/2018 NEGATIVE   NEG mg/dL Final    Glucose 08/29/2018 NEGATIVE   NEG mg/dL Final    Ketone 08/29/2018 NEGATIVE   NEG mg/dL Final    Bilirubin 08/29/2018 NEGATIVE   NEG   Final    Blood 08/29/2018 NEGATIVE   NEG   Final    Urobilinogen 08/29/2018 0.2  0.2 - 1.0 EU/dL Final    Nitrites 08/29/2018 NEGATIVE   NEG   Final    Leukocyte Esterase 08/29/2018 MODERATE* NEG   Final    WBC 08/29/2018 0-4  0 - 4 /hpf Final    RBC 08/29/2018 0-5  0 - 5 /hpf Final    Epithelial cells 08/29/2018 FEW  FEW /lpf Final    Bacteria 08/29/2018 NEGATIVE   NEG /hpf Final    UA:UC IF INDICATED 08/29/2018 CULTURE NOT INDICATED BY UA RESULT  CNI   Final    Hemoglobin A1c 08/29/2018 7.2* 4.2 - 6.3 % Final    Est. average glucose 08/29/2018 160  mg/dL Final    TSH 08/29/2018 6.23* 0.36 - 3.74 uIU/mL Final    INR 08/29/2018 1.0  0.9 - 1.1   Final    Prothrombin time 08/29/2018 10.2  9.0 - 11.1 sec Final    aPTT 08/29/2018 36.4* 22.1 - 32.0 sec Final    aPTT, therapeutic range 08/29/2018      58.0 - 77.0 SECS Final    Glucose (POC) 08/29/2018 153* 65 - 100 mg/dL Final    Performed by 08/29/2018 Zabrina Helms   Final    WBC 08/29/2018 8.6  3.6 - 11.0 K/uL Final    RBC 08/29/2018 4.28  3.80 - 5.20 M/uL Final    HGB 08/29/2018 13.3  11.5 - 16.0 g/dL Final    HCT 08/29/2018 40.1  35.0 - 47.0 % Final    MCV 08/29/2018 93.7  80.0 - 99.0 FL Final    MCH 08/29/2018 31.1  26.0 - 34.0 PG Final    MCHC 08/29/2018 33.2  30.0 - 36.5 g/dL Final    RDW 08/29/2018 13.2  11.5 - 14.5 % Final    PLATELET 75/23/9451 293  150 - 400 K/uL Final    MPV 08/29/2018 10.9  8.9 - 12.9 FL Final    NRBC 08/29/2018 0.0  0  WBC Final    ABSOLUTE NRBC 08/29/2018 0.00  0.00 - 0.01 K/uL Final    NEUTROPHILS 08/29/2018 64  32 - 75 % Final    LYMPHOCYTES 08/29/2018 28  12 - 49 % Final    MONOCYTES 08/29/2018 7  5 - 13 % Final    EOSINOPHILS 08/29/2018 1  0 - 7 % Final    BASOPHILS 08/29/2018 0  0 - 1 % Final    IMMATURE GRANULOCYTES 08/29/2018 0  0.0 - 0.5 % Final    ABS. NEUTROPHILS 08/29/2018 5.5  1.8 - 8.0 K/UL Final    ABS. LYMPHOCYTES 08/29/2018 2.4  0.8 - 3.5 K/UL Final    ABS. MONOCYTES 08/29/2018 0.6  0.0 - 1.0 K/UL Final    ABS. EOSINOPHILS 08/29/2018 0.1  0.0 - 0.4 K/UL Final    ABS. BASOPHILS 08/29/2018 0.0  0.0 - 0.1 K/UL Final    ABS. IMM.  GRANS. 08/29/2018 0.0  0.00 - 0.04 K/UL Final    DF 08/29/2018 AUTOMATED    Final    Glucose (POC) 08/29/2018 118* 65 - 100 mg/dL Final    Performed by 08/29/2018 Manoj Duran   Final    Glucose (POC) 08/30/2018 166* 65 - 100 mg/dL Final    Performed by 08/30/2018 Chelsi Martinez   Final    Glucose (POC) 08/30/2018 135* 65 - 100 mg/dL Final    Performed by 08/30/2018 Christian Farrar   Final    Glucose (POC) 08/31/2018 153* 65 - 100 mg/dL Final    Performed by 08/31/2018 Sin Stark   Final    T4, Total 08/31/2018 11.1  4.8 - 13.9 ug/dL Final    Glucose (POC) 08/31/2018 95  65 - 100 mg/dL Final    Performed by 08/31/2018 KERVIN ORR   Final    Glucose (POC) 09/01/2018 128* 65 - 100 mg/dL Final    Performed by 09/01/2018 Dusty Arboleda   Final    Glucose (POC) 09/01/2018 117* 65 - 100 mg/dL Final    Performed by 09/01/2018 MARTINEZ (Moon) MAHNAZ   Final    Glucose (POC) 09/02/2018 140* 65 - 100 mg/dL Final    Performed by 09/02/2018 Pravin Hermosillo   Final    WBC 09/02/2018 9.5  3.6 - 11.0 K/uL Final    RBC 09/02/2018 4.49  3.80 - 5.20 M/uL Final    HGB 09/02/2018 14.1  11.5 - 16.0 g/dL Final    HCT 09/02/2018 42.8  35.0 - 47.0 % Final    MCV 09/02/2018 95.3  80.0 - 99.0 FL Final    MCH 09/02/2018 31.4  26.0 - 34.0 PG Final    MCHC 09/02/2018 32.9  30.0 - 36.5 g/dL Final    RDW 09/02/2018 13.0  11.5 - 14.5 % Final    PLATELET 10/81/6740 016  150 - 400 K/uL Final    MPV 09/02/2018 10.7  8.9 - 12.9 FL Final    NRBC 09/02/2018 0.0  0  WBC Final    ABSOLUTE NRBC 09/02/2018 0.00  0.00 - 0.01 K/uL Final    Sodium 09/02/2018 137  136 - 145 mmol/L Final    Potassium 09/02/2018 3.6  3.5 - 5.1 mmol/L Final    Chloride 09/02/2018 100  97 - 108 mmol/L Final    CO2 09/02/2018 27  21 - 32 mmol/L Final    Anion gap 09/02/2018 10  5 - 15 mmol/L Final    Glucose 09/02/2018 215* 65 - 100 mg/dL Final    BUN 09/02/2018 17  6 - 20 MG/DL Final    Creatinine 09/02/2018 1.10* 0.55 - 1.02 MG/DL Final    BUN/Creatinine ratio 09/02/2018 15  12 - 20   Final    GFR est AA 09/02/2018 60* >60 ml/min/1.73m2 Final    GFR est non-AA 09/02/2018 49* >60 ml/min/1.73m2 Final    Calcium 09/02/2018 9.0  8.5 - 10.1 MG/DL Final    Glucose (POC) 09/02/2018 95  65 - 100 mg/dL Final    Performed by 09/02/2018 Pravin Hermosillo   Final    Glucose (POC) 09/03/2018 151* 65 - 100 mg/dL Final    Performed by 09/03/2018 Iveth Bautista   Final    Glucose (POC) 09/03/2018 232* 65 - 100 mg/dL Final    Performed by 09/03/2018 MARTINEZ (Moon) MAHNAZ   Final    Glucose (POC) 09/03/2018 168* 65 - 100 mg/dL Final    Performed by 09/03/2018 MARTINEZ (Moon) MAHNAZ   Final    Glucose (POC) 09/04/2018 142* 65 - 100 mg/dL Final    Performed by 09/04/2018 Iveth Bautista   Final    Glucose (POC) 09/04/2018 128* 65 - 100 mg/dL Final    Performed by 09/04/2018 Juan Ramon Xie   Final     No results found. DISPOSITION:    Home. Patient to f/u with psychiatric, and psychotherapy appointments. Patient is to f/u with internist as directed. FOLLOW-UP CARE:    Activity as tolerated  Regular Diet  Wound Care: none needed. Follow-up Information     Follow up With Details Comments [address], [place] On 9/14/2018 You have an 11:00am hospital discharge appointment with your primary care provider. Please arrive 15 minutes early. [address] [facility]  [phone]      [facility] On 9/6/2018 You have a 9:30am appointment for individual therapy.  Plan to be in this appointment until 11:00am. [facility]  [address]  [place], [address] [place] [address]  [phone]                 PROGNOSIS:   Good /- based on nature of patient's pathology/ies and treatment compliance issues. Prognosis is greatly dependent upon patient's ability to remain sober and to follow up with drug/etoh rehabilitation and psychiatric/psychotherapy appointments as well as to comply with psychiatric medications as prescribed. DISCHARGE MEDICATIONS:    Informed consent given for the use of following psychotropic medications:  Discharge Medication List as of 9/4/2018  4:38 PM      CONTINUE these medications which have CHANGED    Details   citalopram (CELEXA) 40 mg tablet Take 1 Tab by mouth daily. Indications: Generalized Anxiety Disorder, major depressive disorder, Print, Disp-15 Tab, R-1      gabapentin (NEURONTIN) 300 mg capsule Take 2 Caps by mouth two (2) times a day. Indications: NEUROPATHIC PAIN, Print, Disp-30 Cap, R-1         CONTINUE these medications which have NOT CHANGED    Details   dilTIAZem (TIAZAC) 360 mg ER capsule Take 360 mg by mouth daily. , Historical Med      rivaroxaban (XARELTO) 20 mg tab tablet Take 20 mg by mouth daily. , Historical Med      traZODone (DESYREL) 50 mg tablet Take 50 mg by mouth nightly. Indications: insomnia associated with depression, Historical Med      valsartan-hydroCHLOROthiazide (DIOVAN-HCT) 160-25 mg per tablet Take 1 Tab by mouth daily. Indications: hypertension, Historical Med      metFORMIN (GLUCOPHAGE) 500 mg tablet Take 1000mg every morning and 500mg every evening  Indications: type 2 diabetes mellitus, Historical Med         STOP taking these medications       furosemide (LASIX) 20 mg tablet Comments:   Reason for Stopping:                      A coordinated, multidisplinary treatment team round was conducted with Orestes Allison---this is done daily here at [facility]. This team consists of the nurse, psychiatric unit pharmcist,  and writer.      I have spent greater than 35 minutes on discharge work.     Signed:  Sonny Piedra MD  9/4/18

## 2023-08-20 NOTE — BH NOTES
PSYCHIATRIC PROGRESS NOTE Patient Name  Anna Sanchez Date of Birth 1950 [facility] [id number] Medical Record Number  [id number] Age  69 y.o. PCP Bright Nettles MD  
Admit date:  8/29/2018 Room Number  725/02  @ [facility]  
Date of Service  9/1/2018 PSYCHOTHERAPY SESSION NOTE: 
Length of psychotherapy session: 20 minutes Main condition/diagnosis/issues treated during session today, 9/1/2018 : familial discord, financial stressors, depression I employed Cognitive Behavioral therapy techniques, Reality-Oriented psychotherapy, as well as supportive psychotherapy in regards to various ongoing psychosocial stressors, including the following: pre-admission and current problems; housing issues; occupational issues; academic issues; legal issues; medical issues; and stress of hospitalization. Interpersonal relationship issues and psychodynamic conflicts explored. Attempts made to alleviate maladaptive patterns. We, also, worked on issues of denial & effects of substance dependency/use Overall, patient is not progressing Treatment Plan Update (reviewed an updated 9/1/2018) : I will modify psychotherapy tx plan by implementing more stress management strategies, building upon cognitive behavioral techniques, increasing coping skills, as well as shoring up psychological defenses). An extended energy and skill set was needed to engage pt in psychotherapy due to some of the following: resistiveness, complexity, negativity, confrontational nature, hostile behaviors, and/or severe abnormalities in thought processes/psychosis resulting in the loss of expressive/receptive language communication skills. E & M PROGRESS NOTE: 
  
 
 
HISTORY  
   
CC:  \"Depression\" HISTORY OF PRESENT ILLNESS/INTERVAL HISTORY:  (reviewed/updated 9/1/2018). per initial evaluation: The patient, Anna Sanchez, is a 69 y.o.   [place]ITE [place]  female with a past psychiatric history significant for MDD, past psychiatric hospitalization, who presents at this time with complaints of (and/or evidence of) the following emotional symptoms: depression, suicidal thoughts/threats and anger ,grief and insomnia. .  Additional symptomatology include death of her  one month ago, son seizing all of the money  In her husbands accounts and inability to pay all of her bills. She was angry with her son and decided to hold a gun to her head in front of her son. She says that she feels that her life is worthless, since she cannot pay all of her bills. Taking celexa rx'd by her pcp. .  The above symptoms have been present for several weeks to years. These symptoms are of high severity. These symptoms are constant in nature. The patient's condition has been precipitated by grief, conflict with son, and psychosocial stressors Merari Allison presents/reports/evidences the following emotional symptoms today, 9/1/2018:depression and suicidal thoughts/threats. The above symptoms have been present for several days to weeks. These symptoms are of  Moderate, improving. . The symptoms are constant in nature. Additional symptomatology and features include low mood, anxiety, irritability, anxiety. Sleep and appetite are improved. Deneis Suicidal thoughts. SIDE EFFECTS: (reviewed/updated 9/1/2018) None reported or admitted to. ALLERGIES:(reviewed/updated 9/1/2018) Allergies Allergen Reactions  Robaxin [Methocarbamol] Anaphylaxis  Morphine Itching MEDICATIONS PRIOR TO ADMISSION:(reviewed/updated 9/1/2018) Prescriptions Prior to Admission Medication Sig  
 citalopram (CELEXA) 20 mg tablet Take 20 mg by mouth daily.  Indications: major depressive disorder  dilTIAZem (TIAZAC) 360 mg ER capsule Take 360 mg by mouth daily.  furosemide (LASIX) 20 mg tablet Take 20 mg by mouth daily as needed.  rivaroxaban (XARELTO) 20 mg tab tablet Take 20 mg by mouth daily.  traZODone (DESYREL) 50 mg tablet Take 50 mg by mouth nightly. Indications: insomnia associated with depression  valsartan-hydroCHLOROthiazide (DIOVAN-HCT) 160-25 mg per tablet Take 1 Tab by mouth daily. Indications: hypertension  metFORMIN (GLUCOPHAGE) 500 mg tablet Take 1000mg every morning and 500mg every evening  Indications: type 2 diabetes mellitus  gabapentin (NEURONTIN) 300 mg capsule Take 300 mg by mouth two (2) times a day. PAST MEDICAL HISTORY: Past medical history from the initial psychiatric evaluation has been reviewed (reviewed/updated 9/1/2018) with no additional updates (I asked patient and no additional past medical history provided). No past medical history on file. No past surgical history on file. SOCIAL HISTORY: Social history from the initial psychiatric evaluation has been reviewed (reviewed/updated 9/1/2018) with no additional updates (I asked patient and no additional social history provided). Social History Social History  Marital status:  Spouse name: N/A  
 Number of children: N/A  
 Years of education: N/A Occupational History  Not on file. Social History Main Topics  Smoking status: Not on file  Smokeless tobacco: Not on file  Alcohol use Not on file  Drug use: Not on file  Sexual activity: Not on file Other Topics Concern  Not on file Social History Narrative FAMILY HISTORY: Family history from the initial psychiatric evaluation has been reviewed (reviewed/updated 9/1/2018) with no additional updates (I asked patient and no additional family history provided). No family history on file. REVIEW OF SYSTEMS: (reviewed/updated 9/1/2018) Appetite:good Sleep: good All other Review of Systems: depressed mood and anxiety HEENT: headache, neck stiffness Resp: (-)sob [place]: (-)cp [place] MENTAL STATUS EXAM (MSE): MSE FINDINGS ARE WITHIN NORMAL LIMITS (WNL) UNLESS OTHERWISE STATED BELOW. ( ALL OF THE BELOW CATEGORIES OF THE MSE HAVE BEEN REVIEWED (reviewed 9/1/2018) AND UPDATED AS DEEMED APPROPRIATE ) General Presentation age appropriate and casually dressed, cooperative Orientation oriented to time, place and person Vital Signs  See below (reviewed 9/1/2018); Vital Signs (BP, Pulse, & Temp) are within normal limits if not listed below. Gait and Station Stable/steady, no ataxia Musculoskeletal System No extrapyramidal symptoms (EPS); no abnormal muscular movements or Tardive Dyskinesia (TD); muscle strength and tone are within normal limits Language No aphasia or dysarthria Speech:  normal pitch and normal volume Thought Processes logical; normal rate of thoughts; good abstract reasoning/computation Thought Associations goal directed Thought Content not internally preoccupied Suicidal Ideations none Homicidal Ideations none Mood:  euthymic Affect:  mood-congruent Memory recent  fair Memory remote:  fair Concentration/Attention:  hypervigilance Fund of Knowledge wnl Insight:  fair Reliability fair Judgment:  fair VITALS:    
Patient Vitals for the past 24 hrs: 
 Temp Pulse Resp BP SpO2  
09/01/18 0749 97.9 °F (36.6 °C) 96 16 145/84 96 % 08/31/18 2056 98.5 °F (36.9 °C) 89 20 130/75 96 % 08/31/18 1605 98.3 °F (36.8 °C) 83 16 111/66 97 % 08/31/18 1155 98.1 °F (36.7 °C) 86 16 128/77 95 % Wt Readings from Last 3 Encounters:  
08/30/18 74.5 kg (164 lb 5 oz) Temp Readings from Last 3 Encounters:  
09/01/18 97.9 °F (36.6 °C) BP Readings from Last 3 Encounters:  
09/01/18 145/84 Pulse Readings from Last 3 Encounters:  
09/01/18 96 DATA Acute exacerbation of bronchiectasis LABORATORY DATA:(reviewed/updated 9/1/2018) Recent Results (from the past 24 hour(s))  
T4 (THYROXINE) Collection Time: 08/31/18  3:30 PM  
Result Value Ref Range T4, Total 11.1 4.8 - 13.9 ug/dL GLUCOSE, POC Collection Time: 08/31/18  4:03 PM  
Result Value Ref Range Glucose (POC) 95 65 - 100 mg/dL Performed by Dayami Lock   
GLUCOSE, POC Collection Time: 09/01/18  8:30 AM  
Result Value Ref Range Glucose (POC) 128 (H) 65 - 100 mg/dL Performed by Vinny Riley No results found for: VALF2, VALAC, VALP, VALPR, DS6, CRBAM, CRBAMP, CARB2, XCRBAM 
No results found for: LITHM  
RADIOLOGY REPORTS:(reviewed/updated 9/1/2018) No results found. MEDICATIONS ALL MEDICATIONS:  
Current Facility-Administered Medications Medication Dose Route Frequency  insulin lispro (HUMALOG) injection   SubCUTAneous BID WITH MEALS  
 losartan (COZAAR) tablet 100 mg  100 mg Oral DAILY And  
 hydroCHLOROthiazide (HYDRODIURIL) tablet 25 mg  25 mg Oral DAILY  LORazepam (ATIVAN) tablet 0.5 mg  0.5 mg Oral Q6H PRN  
 metFORMIN (GLUCOPHAGE) tablet 1,000 mg  1,000 mg Oral DAILY WITH BREAKFAST  gabapentin (NEURONTIN) capsule 600 mg  600 mg Oral BID  citalopram (CELEXA) tablet 40 mg  40 mg Oral DAILY  ziprasidone (GEODON) 20 mg in sterile water (preservative free) 1 mL injection  20 mg IntraMUSCular BID PRN  
 OLANZapine (ZyPREXA) tablet 5 mg  5 mg Oral Q6H PRN  
 benztropine (COGENTIN) tablet 2 mg  2 mg Oral BID PRN  
 benztropine (COGENTIN) injection 2 mg  2 mg IntraMUSCular BID PRN  
 LORazepam (ATIVAN) injection 2 mg  2 mg IntraMUSCular Q4H PRN  
 zolpidem (AMBIEN) tablet 5 mg  5 mg Oral QHS PRN  
 acetaminophen (TYLENOL) tablet 650 mg  650 mg Oral Q4H PRN  
 magnesium hydroxide (MILK OF MAGNESIA) 400 mg/5 mL oral suspension 30 mL  30 mL Oral DAILY PRN  
 nicotine (NICODERM CQ) 21 mg/24 hr patch 1 Patch  1 Patch TransDERmal DAILY PRN  
  traZODone (DESYREL) tablet 50 mg  50 mg Oral QHS  metFORMIN (GLUCOPHAGE) tablet 500 mg  500 mg Oral QHS  glucose chewable tablet 16 g  4 Tab Oral PRN  
 dextrose (D50W) injection syrg 12.5-25 g  25-50 mL IntraVENous PRN  
 glucagon (GLUCAGEN) injection 1 mg  1 mg IntraMUSCular PRN  
 rivaroxaban (XARELTO) tablet 20 mg  20 mg Oral DAILY WITH DINNER  
 dilTIAZem CD (CARDIZEM CD) capsule 360 mg  360 mg Oral DAILY  loperamide (IMODIUM) capsule 4 mg  4 mg Oral Q6H PRN  
  
SCHEDULED MEDICATIONS:  
Current Facility-Administered Medications Medication Dose Route Frequency  insulin lispro (HUMALOG) injection   SubCUTAneous BID WITH MEALS  
 losartan (COZAAR) tablet 100 mg  100 mg Oral DAILY And  
 hydroCHLOROthiazide (HYDRODIURIL) tablet 25 mg  25 mg Oral DAILY  metFORMIN (GLUCOPHAGE) tablet 1,000 mg  1,000 mg Oral DAILY WITH BREAKFAST  gabapentin (NEURONTIN) capsule 600 mg  600 mg Oral BID  citalopram (CELEXA) tablet 40 mg  40 mg Oral DAILY  traZODone (DESYREL) tablet 50 mg  50 mg Oral QHS  metFORMIN (GLUCOPHAGE) tablet 500 mg  500 mg Oral QHS  rivaroxaban (XARELTO) tablet 20 mg  20 mg Oral DAILY WITH DINNER  
 dilTIAZem CD (CARDIZEM CD) capsule 360 mg  360 mg Oral DAILY ASSESSMENT & PLAN  
 
DIAGNOSES REQUIRING ACTIVE TREATMENT AND MONITORING: (reviewed/updated 9/1/2018) Patient Active Hospital Problem List: 
 Major depressive disorder (8/29/2018) Assessment: moderate to severe Plan: Agree with inpatient hospitalization for further stabilization, safety monitoring and medication management Medications- continue increased dose of Celexa to 40mg daily Provided supportive psychotherapy 09/1/18: Continue current treatment. Type 2 diabetes mellitus ([facility] [address] (8/29/2018) Assessment: chronic Plan: continue to monitor glc Continue current regimen I will continue to monitor blood levels (Depakote, Tegretol, lithium, clozapine---a drug with a narrow therapeutic index= NTI) and associated labs for drug therapy implemented that require intense monitoring for toxicity as deemed appropriate based on current medication side effects and pharmacodynamically determined drug 1/2 lives. In summary, David Sen, is a 69 y.o.  female who presents with a severe exacerbation of the principal diagnosis of Major depressive disorder Patient's condition is mproving. Patient requires continued inpatient hospitalization for further stabilization, safety monitoring and medication management. I will continue to coordinate the provision of individual, milieu, occupational, group, and substance abuse therapies to address target symptoms/diagnoses as deemed appropriate for the individual patient. A coordinated, multidisplinary treatment team round was conducted with the patient (this team consists of the nurse, psychiatric unit pharmcist,  and writer). Complete current electronic health record for patient has been reviewed today including consultant notes, ancillary staff notes, nurses and psychiatric tech notes. Suicide risk assessment completed and patient deemed to be of low risk for suicide at this time. The following regarding medications was addressed during rounds with patient:  
the risks and benefits of the proposed medication. The patient was given the opportunity to ask questions. Informed consent given to the use of the above medications. Will continue to adjust psychiatric and non-psychiatric medications (see above \"medication\" section and orders section for details) as deemed appropriate & based upon diagnoses and response to treatment. I will continue to order blood tests/labs and diagnostic tests as deemed appropriate and review results as they become available (see orders for details and above listed lab/test results).  
 
I will order psychiatric records from previous [facility] hospitals to further elucidate the nature of patient's psychopathology and review once available. I will gather additional collateral information from friends, family and o/p treatment team to further elucidate the nature of patient's psychopathology and baselline level of psychiatric functioning. I certify that this patient's inpatient psychiatric hospital services furnished since the previous certification were, and continue to be, required for treatment that could reasonably be expected to improve the patient's condition, or for diagnostic study, and that the patient continues to need, on a daily basis, active treatment furnished directly by or requiring the supervision of inpatient psychiatric facility personnel. In addition, the hospital records show that services furnished were intensive treatment services, admission or related services, or equivalent services. EXPECTED DISCHARGE DATE/DAY: TBD  
 
DISPOSITION: Home Signed By:  
Kumar Caal MD 
9/1/2018